# Patient Record
Sex: MALE | Race: WHITE | NOT HISPANIC OR LATINO | Employment: UNEMPLOYED | URBAN - METROPOLITAN AREA
[De-identification: names, ages, dates, MRNs, and addresses within clinical notes are randomized per-mention and may not be internally consistent; named-entity substitution may affect disease eponyms.]

---

## 2018-05-10 ENCOUNTER — APPOINTMENT (EMERGENCY)
Dept: RADIOLOGY | Facility: HOSPITAL | Age: 53
End: 2018-05-10
Payer: COMMERCIAL

## 2018-05-10 ENCOUNTER — HOSPITAL ENCOUNTER (EMERGENCY)
Facility: HOSPITAL | Age: 53
Discharge: HOME | End: 2018-05-10
Attending: EMERGENCY MEDICINE | Admitting: EMERGENCY MEDICINE
Payer: COMMERCIAL

## 2018-05-10 VITALS
WEIGHT: 180 LBS | DIASTOLIC BLOOD PRESSURE: 83 MMHG | RESPIRATION RATE: 17 BRPM | HEIGHT: 68 IN | HEART RATE: 95 BPM | OXYGEN SATURATION: 98 % | TEMPERATURE: 98.3 F | BODY MASS INDEX: 27.28 KG/M2 | SYSTOLIC BLOOD PRESSURE: 134 MMHG

## 2018-05-10 DIAGNOSIS — S83.90XA KNEE SPRAIN: Primary | ICD-10-CM

## 2018-05-10 PROCEDURE — 99283 EMERGENCY DEPT VISIT LOW MDM: CPT

## 2018-05-10 PROCEDURE — 73564 X-RAY EXAM KNEE 4 OR MORE: CPT

## 2018-05-10 NOTE — ED PROVIDER NOTES
History  Chief Complaint   Patient presents with    Knee Pain     pt with right knee pain and swelling after tripping on steps      Patient is a pleasant 69-year-old male that reports to the emergency department with right knee pain after falling  He has a history of surgery on that right knee  No fevers, chills, sweats, nausea, vomiting, diarrhea  No obvious ligamentous injury  Medical decision making: This is a well-appearing 69-year-old male, likely knee sprain, will have him follow up with Orthopedics  The patient (and any family present) verbalized understanding of the discharge instructions and warnings that would necessitate return to the Emergency Department  Gave verbal in addition to written discharge instructions  Specifically highlighted areas of special concern regarding the written and verbal discharge instructions and return precautions  All questions were answered prior to discharge  Prior to Admission Medications   Prescriptions Last Dose Informant Patient Reported? Taking?   amoxicillin-clavulanate (AUGMENTIN) 875-125 mg per tablet   No No   Sig: Take 1 tablet by mouth every 12 (twelve) hours   losartan (COZAAR) 25 mg tablet   Yes No   Sig: Take 25 mg by mouth daily   metFORMIN (GLUCOPHAGE) 1000 MG tablet   Yes No   Sig: Take 1,000 mg by mouth 2 (two) times a day with meals   simvastatin (ZOCOR) 20 mg tablet   Yes No   Sig: Take 20 mg by mouth daily at bedtime      Facility-Administered Medications: None       History reviewed  No pertinent past medical history  Past Surgical History:   Procedure Laterality Date    EYE SURGERY      KNEE SURGERY         History reviewed  No pertinent family history  I have reviewed and agree with the history as documented      Social History   Substance Use Topics    Smoking status: Never Smoker    Smokeless tobacco: Never Used    Alcohol use Yes      Comment: social        Review of Systems   Musculoskeletal: Positive for arthralgias  All other systems reviewed and are negative  Physical Exam  ED Triage Vitals [05/10/18 1810]   Temperature Pulse Respirations Blood Pressure SpO2   98 3 °F (36 8 °C) 95 16 130/78 100 %      Temp Source Heart Rate Source Patient Position - Orthostatic VS BP Location FiO2 (%)   Oral Monitor Sitting Right arm --      Pain Score       8           Orthostatic Vital Signs  Vitals:    05/10/18 1810   BP: 130/78   Pulse: 95   Patient Position - Orthostatic VS: Sitting       Physical Exam   Constitutional: He is oriented to person, place, and time  He appears well-developed and well-nourished  HENT:   Head: Normocephalic and atraumatic  Eyes: EOM are normal  Pupils are equal, round, and reactive to light  Neck: Normal range of motion  Neck supple  Cardiovascular: Normal rate, regular rhythm and normal heart sounds  No murmur heard  Pulmonary/Chest: Effort normal and breath sounds normal  No respiratory distress  He has no wheezes  Abdominal: Soft  Bowel sounds are normal  He exhibits no distension  There is no tenderness  Musculoskeletal: Normal range of motion  He exhibits tenderness  He exhibits no edema  Neurological: He is alert and oriented to person, place, and time  No cranial nerve deficit  Coordination normal    Skin: Skin is warm and dry  He is not diaphoretic  No erythema  Psychiatric: He has a normal mood and affect  His behavior is normal    Nursing note and vitals reviewed        ED Medications  Medications - No data to display    Diagnostic Studies  Results Reviewed     None                 XR knee 4+ vw right injury    (Results Pending)              Procedures  Procedures       Phone Contacts  ED Phone Contact    ED Course                               MDM  CritCare Time    Disposition  Final diagnoses:   Knee sprain     Time reflects when diagnosis was documented in both MDM as applicable and the Disposition within this note     Time User Action Codes Description Comment    5/10/2018  7:29 PM Simran Jackson 69 Knee sprain       ED Disposition     ED Disposition Condition Comment    Discharge  Keerthi Tawnyks discharge to home/self care  Condition at discharge: Good        Follow-up Information     Follow up With Specialties Details Why [facility]  In 3 days          Patient's Medications   Discharge Prescriptions    No medications on file     No discharge procedures on file      ED Provider  Electronically Signed by           Kim Chu MD  05/10/18 7450

## 2018-05-10 NOTE — DISCHARGE INSTRUCTIONS
Knee Sprain   WHAT YOU NEED TO KNOW:   A knee sprain occurs when one or more ligaments in your knee are suddenly stretched or torn  Ligaments are tissues that hold bones together  Ligaments support the knee and keep the joint and bones in the correct position  DISCHARGE INSTRUCTIONS:   Return to the emergency department if:   · Any part of your leg feels cold, numb, or looks pale     Contact your healthcare provider if:   · You have new or increased swelling, bruising, or pain in your knee  · Your symptoms do not improve within 6 weeks, even with treatment  · You have questions or concerns about your condition or care  Medicines:   · NSAIDs , such as ibuprofen, help decrease swelling, pain, and fever  This medicine is available with or without a doctor's order  NSAIDs can cause stomach bleeding or kidney problems in certain people  If you take blood thinner medicine, always ask your healthcare provider if NSAIDs are safe for you  Always read the medicine label and follow directions  · Acetaminophen  decreases pain and fever  It is available without a doctor's order  Ask how much to take and how often to take it  Follow directions  Read the labels of all other medicines you are using to see if they also contain acetaminophen, or ask your doctor or pharmacist  Acetaminophen can cause liver damage if not taken correctly  Do not use more than 4 grams (4,000 milligrams) total of acetaminophen in one day  · Prescription pain medicine  may be given  Ask how to take this medicine safely  · Take your medicine as directed  Contact your healthcare provider if you think your medicine is not helping or if you have side effects  Tell him or her if you are allergic to any medicine  Keep a list of the medicines, vitamins, and herbs you take  Include the amounts, and when and why you take them  Bring the list or the pill bottles to follow-up visits   Carry your medicine list with you in case of an emergency  Self-care:   · Rest  your knee and do not exercise  You may be told to keep weight off your knee  This means that you should not walk on your injured leg  Rest helps decrease swelling and allows the injury to heal  You can do gentle range of motion (ROM) exercises as directed  This will prevent stiffness  · Apply ice  on your knee for 15 to 20 minutes every hour or as directed  Use an ice pack, or put crushed ice in a plastic bag  Cover it with a towel  Ice helps prevent tissue damage and decreases swelling and pain  · Apply compression to your knee as directed  You may need to wear an elastic bandage  This helps keep your injured knee from moving too much while it heals  You can loosen or tighten the elastic bandage to make it comfortable  It should be tight enough for you to feel support  It should not be so tight that it causes your toes to feel numb or tingly  If you are wearing an elastic bandage, take it off and rewrap it once a day  · Elevate your knee  above the level of your heart as often as you can  This will help decrease swelling and pain  Prop your leg on pillows or blankets to keep it elevated comfortably  Do not put pillows directly behind your knee  · Use support devices as directed:  Support devices such as a splint or brace may be needed  These devices limit movement and protect your joint while it heals  You may be given crutches to use until you can stand on your injured leg without pain  Use devices as directed  Physical therapy:  A physical therapist teaches you exercises to help improve movement and strength, and to decrease pain  Prevent another knee sprain:  Exercise your legs to keep your muscles strong  Strong leg muscles help protect your knee and prevent strain  The following may also prevent a knee sprain:  · Slowly start your exercise or training program   Slowly increase the time, distance, and intensity of your exercise   Sudden increases in training may cause you to injure your knee again  · Wear protective braces and equipment as directed  Braces may prevent your knee from moving the wrong way and causing another sprain  Protective equipment may support your bones and ligaments to prevent injury  · Warm up and stretch before exercise  Warm up by walking or using an exercise bike before starting your regular exercise  Do gentle stretches after warming up  This helps to loosen your muscles and decrease stress on your knee  Cool down and stretch after you exercise  · Wear shoes that fit correctly and support your feet  Replace your running or exercise shoes before the padding or shock absorption is worn out  Ask your healthcare provider which exercise shoes are best for you  Ask if you should wear special shoe inserts  Shoe inserts can help support your heels and arches or keep your foot lined up correctly in your shoes  Exercise on flat surfaces  Follow up with your healthcare provider as directed:  Write down your questions so you remember to ask them during your visits  © 2017 [facility] Information is for End User's use only and may not be sold, redistributed or otherwise used for commercial purposes  All illustrations and images included in CareNotes® are the copyrighted property of [employer]  or [employer]  The above information is an  only  It is not intended as medical advice for individual conditions or treatments  Talk to your doctor, nurse or pharmacist before following any medical regimen to see if it is safe and effective for you

## 2019-01-02 ENCOUNTER — APPOINTMENT (EMERGENCY)
Dept: RADIOLOGY | Facility: HOSPITAL | Age: 54
End: 2019-01-02
Payer: COMMERCIAL

## 2019-01-02 ENCOUNTER — HOSPITAL ENCOUNTER (EMERGENCY)
Facility: HOSPITAL | Age: 54
Discharge: HOME | End: 2019-01-02
Attending: EMERGENCY MEDICINE | Admitting: EMERGENCY MEDICINE
Payer: COMMERCIAL

## 2019-01-02 VITALS
OXYGEN SATURATION: 95 % | HEIGHT: 68 IN | BODY MASS INDEX: 28.04 KG/M2 | DIASTOLIC BLOOD PRESSURE: 79 MMHG | SYSTOLIC BLOOD PRESSURE: 121 MMHG | HEART RATE: 106 BPM | RESPIRATION RATE: 18 BRPM | TEMPERATURE: 98.3 F | WEIGHT: 185 LBS

## 2019-01-02 DIAGNOSIS — R07.81 RIB PAIN ON RIGHT SIDE: Primary | ICD-10-CM

## 2019-01-02 LAB
ATRIAL RATE: 102 BPM
P AXIS: 54 DEGREES
PR INTERVAL: 144 MS
QRS AXIS: 19 DEGREES
QRSD INTERVAL: 82 MS
QT INTERVAL: 334 MS
QTC INTERVAL: 435 MS
T WAVE AXIS: 39 DEGREES
VENTRICULAR RATE: 102 BPM

## 2019-01-02 PROCEDURE — 93010 ELECTROCARDIOGRAM REPORT: CPT | Performed by: INTERNAL MEDICINE

## 2019-01-02 PROCEDURE — 93005 ELECTROCARDIOGRAM TRACING: CPT

## 2019-01-02 PROCEDURE — 71101 X-RAY EXAM UNILAT RIBS/CHEST: CPT

## 2019-01-02 PROCEDURE — 99284 EMERGENCY DEPT VISIT MOD MDM: CPT

## 2019-01-02 RX ORDER — IBUPROFEN 600 MG/1
600 TABLET ORAL ONCE
Status: COMPLETED | OUTPATIENT
Start: 2019-01-02 | End: 2019-01-02

## 2019-01-02 RX ADMIN — IBUPROFEN 600 MG: 600 TABLET ORAL at 18:58

## 2019-01-02 NOTE — ED PROVIDER NOTES
History  Chief Complaint   Patient presents with    Rib Injury     Patient presents to ER as a walk in from home for an injury to his ribs from around 1300 hours today - hit was an aproximately 10-12" diamater log from a tree he was helping cut down  Small abrasion noted R side ribs  Pt reports 8/10 pain with deep breaths, 7/10 at rest     HPI     56-year-old male with history of type 2 diabetes and hyperlipidemia who presents for evaluation of pain in his right rib cage  Patient states that he was helping a friend move some tree logs at 1:00 p m  this afternoon when a tree fell and hit him in the R ribcage  He did not fall to the ground, did not hit his head or lose consciousness  He states that the pain is worse with certain movements and with taking deep breaths  Denies shortness of breath  He is not on blood thinners  Denies any other areas of pain  No prior history of rib fractures  Denies any chest pain prior to getting hit in the ribs  He has not tried anything to help with the pain  Prior to Admission Medications   Prescriptions Last Dose Informant Patient Reported? Taking?   losartan (COZAAR) 25 mg tablet   Yes No   Sig: Take 25 mg by mouth daily   metFORMIN (GLUCOPHAGE) 1000 MG tablet   Yes No   Sig: Take 1,000 mg by mouth 2 (two) times a day with meals   simvastatin (ZOCOR) 20 mg tablet   Yes No   Sig: Take 20 mg by mouth daily at bedtime   timolol (BETIMOL) 0 25 % ophthalmic solution   Yes Yes   Sig: Administer 2 drops into the left eye 2 (two) times a day      Facility-Administered Medications: None       Past Medical History:   Diagnosis Date    Diabetes mellitus ([facility] [address] )     type 2, diagnosed in 2013       Past Surgical History:   Procedure Laterality Date    EYE SURGERY      R eye,     KNEE SURGERY         History reviewed  No pertinent family history  I have reviewed and agree with the history as documented      Social History   Substance Use Topics    Smoking status: Never Smoker    Smokeless tobacco: Never Used    Alcohol use Yes      Comment: social        Review of Systems   Constitutional: Negative for chills and fever  HENT: Negative for congestion  Eyes: Negative for visual disturbance  Respiratory: Negative for cough and shortness of breath  Cardiovascular: Positive for chest pain (R chest wall)  Negative for leg swelling  Gastrointestinal: Negative for abdominal pain, nausea and vomiting  Musculoskeletal: Negative for arthralgias, back pain, neck pain and neck stiffness  Skin: Negative for rash  Neurological: Negative for weakness, numbness and headaches  Psychiatric/Behavioral: Negative for agitation, behavioral problems and confusion  Physical Exam  Physical Exam   Constitutional: He is oriented to person, place, and time  He appears well-developed and well-nourished  No distress  HENT:   Head: Normocephalic and atraumatic  Right Ear: External ear normal    Left Ear: External ear normal    Nose: Nose normal    Mouth/Throat: Oropharynx is clear and moist    Eyes: Conjunctivae are normal    Neck: Normal range of motion  Neck supple  Cardiovascular: Regular rhythm, normal heart sounds and intact distal pulses  Exam reveals no gallop and no friction rub  No murmur heard  Tachycardic to 105 bpm   Pulmonary/Chest: Effort normal and breath sounds normal  No respiratory distress  He has no wheezes  He has no rales  He exhibits tenderness (TTP to the R lateral chest wall, no palpable rib fractures, no bruising)  Abdominal: Soft  Bowel sounds are normal  He exhibits no distension  There is no tenderness  There is no guarding  Musculoskeletal: Normal range of motion  He exhibits no edema or deformity  Neurological: He is alert and oriented to person, place, and time  He exhibits normal muscle tone  Skin: Skin is warm and dry  He is not diaphoretic         Vital Signs  ED Triage Vitals [01/02/19 1726]   Temperature Pulse Respirations Blood Pressure SpO2   98 3 °F (36 8 °C) (!) 106 18 121/79 95 %      Temp src Heart Rate Source Patient Position - Orthostatic VS BP Location FiO2 (%)   -- -- Lying Right arm --      Pain Score       5           Vitals:    01/02/19 1726   BP: 121/79   Pulse: (!) 106   Patient Position - Orthostatic VS: Lying       Visual Acuity      ED Medications  Medications   ibuprofen (MOTRIN) tablet 600 mg (600 mg Oral Given 1/2/19 8978)       Diagnostic Studies  Results Reviewed     None                 XR ribs with pa chest min 3 views RIGHT   Final Result by oNrma Mohr MD (01/02 2025)      No active cardiopulmonary disease  No evidence of rib fractures  Workstation performed: [id number]                    Procedures  Procedures       Phone Contacts  ED Phone Contact    ED Course                               MDM  Number of Diagnoses or Management Options  Rib pain on right side: new and requires workup  Diagnosis management comments: Generally well appearing, afebrile and hemodynamically stable, mildly tachycardic to 105 beats per minute, improved prior to discharge  Patient has normal lung sounds bilaterally, has some tenderness to palpation of the right rib cage but no palpable rib fractures  No cough  Rib films obtained with no acute fractures, no evidence of pneumothorax  Suspect contusion  No bruising on the chest wall or abdominal tenderness to suggest need for further imaging or further traumatic injury  Patient did not hit his head or lose consciousness  Suspect contusion of the chest wall  Patient given incentive spirometer and taught how to use it  He was offered pain medication however declines  Recommend Motrin and Tylenol as needed  He was discharged in good condition with return precautions discussed for increased chest pain or shortness of breath         Amount and/or Complexity of Data Reviewed  Tests in the radiology section of CPT®: ordered and reviewed  Independent visualization of images, tracings, or specimens: yes    Patient Progress  Patient progress: stable    CritCare Time           Disposition  Final diagnoses:   Rib pain on right side     Time reflects when diagnosis was documented in both MDM as applicable and the Disposition within this note     Time User Action Codes Description Comment    1/2/2019  8:47 PM Evelio Rico Add [R07 81] Rib pain on right side       ED Disposition     ED Disposition Condition Comment    Discharge  Pepe Link discharge to home/self care  Condition at discharge: Good        Follow-up Information     Follow up With Specialties Details Why [address] Asif Joseph MD Family Medicine In 1 week If pain persists  [address]   [address] Emergency Department Emergency Medicine  For sudden worsening of pain with shortness of breath  [facility] [address]  [address] [place] [address] ED, [address] [place] [address], [facility]          Discharge Medication List as of 1/2/2019  8:48 PM      CONTINUE these medications which have NOT CHANGED    Details   timolol (BETIMOL) 0 25 % ophthalmic solution Administer 2 drops into the left eye 2 (two) times a day, Historical Med      losartan (COZAAR) 25 mg tablet Take 25 mg by mouth daily, Until Discontinued, Historical Med      metFORMIN (GLUCOPHAGE) 1000 MG tablet Take 1,000 mg by mouth 2 (two) times a day with meals, Until Discontinued, Historical Med      simvastatin (ZOCOR) 20 mg tablet Take 20 mg by mouth daily at bedtime, Until Discontinued, Historical Med           No discharge procedures on file      ED Provider  Electronically Signed by           Jimmy Thomas MD  01/02/19 0605

## 2019-01-03 NOTE — DISCHARGE INSTRUCTIONS
Chest Wall Pain, Ambulatory Care   GENERAL INFORMATION:   Chest wall pain  may be caused by problems with the muscles, cartilage, or bones of the chest wall  Chest wall pain may also be caused by pain that spreads to your chest from another part of your body  The pain may be aching, severe, dull, or sharp  It may come and go, or it may be constant  The pain may be worse when you move in certain ways, breathe deeply, or cough  Seek immediate care for the following symptoms:   · Severe pain    · You have any of the following signs of a heart attack:      ¨ Squeezing, pressure, or pain in your chest that lasts longer than 5 minutes or returns    ¨ Discomfort or pain in your back, neck, jaw, stomach, or arm     ¨ Trouble breathing    ¨ Nausea or vomiting    ¨ Lightheadedness or a sudden cold sweat, especially with chest pain or trouble breathing  Treatment  depends on the cause of your chest wall pain  You may need any of the following:  · NSAIDs  help decrease swelling and pain or fever  This medicine is available with or without a doctor's order  NSAIDs can cause stomach bleeding or kidney problems in certain people  If you take blood thinner medicine, always ask your healthcare provider if NSAIDs are safe for you  Always read the medicine label and follow directions  · Acetaminophen  decreases pain  It is available without a doctor's order  Ask how much to take and how often to take it  Follow directions  Acetaminophen can cause liver damage if not taken correctly  · Apply heat  on your chest for 20 to 30 minutes every 2 hours for as many days as directed  Heat helps decrease pain and muscle spasms  · Apply ice  on your chest for 15 to 20 minutes every hour or as directed  Use an ice pack, or put crushed ice in a plastic bag  Cover it with a towel  Ice helps prevent tissue damage and decreases swelling and pain    Follow up with your healthcare provider as directed:  Write down your questions so you remember to ask them during your visits  CARE AGREEMENT:   You have the right to help plan your care  Learn about your health condition and how it may be treated  Discuss treatment options with your caregivers to decide what care you want to receive  You always have the right to refuse treatment  The above information is an  only  It is not intended as medical advice for individual conditions or treatments  Talk to your doctor, nurse or pharmacist before following any medical regimen to see if it is safe and effective for you  © 2014 [address] is for End User's use only and may not be sold, redistributed or otherwise used for commercial purposes  All illustrations and images included in CareNotes® are the copyrighted property of [email or web address]  or [employer]

## 2021-01-05 ENCOUNTER — APPOINTMENT (EMERGENCY)
Dept: RADIOLOGY | Facility: HOSPITAL | Age: 56
DRG: 871 | End: 2021-01-05
Payer: COMMERCIAL

## 2021-01-05 ENCOUNTER — HOSPITAL ENCOUNTER (INPATIENT)
Facility: HOSPITAL | Age: 56
LOS: 5 days | Discharge: HOME | DRG: 871 | End: 2021-01-11
Attending: EMERGENCY MEDICINE | Admitting: INTERNAL MEDICINE
Payer: COMMERCIAL

## 2021-01-05 DIAGNOSIS — U07.1 PNEUMONIA DUE TO COVID-19 VIRUS: ICD-10-CM

## 2021-01-05 DIAGNOSIS — J12.82 PNEUMONIA DUE TO COVID-19 VIRUS: ICD-10-CM

## 2021-01-05 DIAGNOSIS — J96.01 ACUTE RESPIRATORY FAILURE WITH HYPOXIA (HCC): ICD-10-CM

## 2021-01-05 DIAGNOSIS — U07.1 COVID-19: Primary | ICD-10-CM

## 2021-01-05 LAB
ALBUMIN SERPL BCP-MCNC: 3 G/DL (ref 3.5–5)
ALP SERPL-CCNC: 62 U/L (ref 46–116)
ALT SERPL W P-5'-P-CCNC: 30 U/L (ref 12–78)
ANION GAP SERPL CALCULATED.3IONS-SCNC: 10 MMOL/L (ref 4–13)
APTT PPP: 37 SECONDS (ref 23–37)
AST SERPL W P-5'-P-CCNC: 28 U/L (ref 5–45)
BASOPHILS # BLD AUTO: 0.01 THOUSANDS/ΜL (ref 0–0.1)
BASOPHILS NFR BLD AUTO: 0 % (ref 0–1)
BILIRUB SERPL-MCNC: 0.7 MG/DL (ref 0.2–1)
BUN SERPL-MCNC: 13 MG/DL (ref 5–25)
CALCIUM ALBUM COR SERPL-MCNC: 9.1 MG/DL (ref 8.3–10.1)
CALCIUM SERPL-MCNC: 8.3 MG/DL (ref 8.3–10.1)
CHLORIDE SERPL-SCNC: 94 MMOL/L (ref 100–108)
CK SERPL-CCNC: 91 U/L (ref 39–308)
CO2 SERPL-SCNC: 28 MMOL/L (ref 21–32)
CREAT SERPL-MCNC: 0.91 MG/DL (ref 0.6–1.3)
CRP SERPL QL: 88.4 MG/L
D DIMER PPP FEU-MCNC: 0.47 UG/ML FEU
EOSINOPHIL # BLD AUTO: 0 THOUSAND/ΜL (ref 0–0.61)
EOSINOPHIL NFR BLD AUTO: 0 % (ref 0–6)
ERYTHROCYTE [DISTWIDTH] IN BLOOD BY AUTOMATED COUNT: 13.1 % (ref 11.6–15.1)
GFR SERPL CREATININE-BSD FRML MDRD: 95 ML/MIN/1.73SQ M
GLUCOSE SERPL-MCNC: 201 MG/DL (ref 65–140)
HCT VFR BLD AUTO: 41.4 % (ref 36.5–49.3)
HGB BLD-MCNC: 14.7 G/DL (ref 12–17)
IMM GRANULOCYTES # BLD AUTO: 0.02 THOUSAND/UL (ref 0–0.2)
IMM GRANULOCYTES NFR BLD AUTO: 0 % (ref 0–2)
INR PPP: 1.11 (ref 0.84–1.19)
LACTATE SERPL-SCNC: 1.1 MMOL/L (ref 0.5–2)
LYMPHOCYTES # BLD AUTO: 0.8 THOUSANDS/ΜL (ref 0.6–4.47)
LYMPHOCYTES NFR BLD AUTO: 13 % (ref 14–44)
MAGNESIUM SERPL-MCNC: 2.2 MG/DL (ref 1.6–2.6)
MCH RBC QN AUTO: 30.5 PG (ref 26.8–34.3)
MCHC RBC AUTO-ENTMCNC: 35.5 G/DL (ref 31.4–37.4)
MCV RBC AUTO: 86 FL (ref 82–98)
MONOCYTES # BLD AUTO: 0.35 THOUSAND/ΜL (ref 0.17–1.22)
MONOCYTES NFR BLD AUTO: 6 % (ref 4–12)
NEUTROPHILS # BLD AUTO: 5.17 THOUSANDS/ΜL (ref 1.85–7.62)
NEUTS SEG NFR BLD AUTO: 81 % (ref 43–75)
NRBC BLD AUTO-RTO: 0 /100 WBCS
NT-PROBNP SERPL-MCNC: 69 PG/ML
PLATELET # BLD AUTO: 193 THOUSANDS/UL (ref 149–390)
PMV BLD AUTO: 10.1 FL (ref 8.9–12.7)
POTASSIUM SERPL-SCNC: 3.5 MMOL/L (ref 3.5–5.3)
PROT SERPL-MCNC: 7.2 G/DL (ref 6.4–8.2)
PROTHROMBIN TIME: 14.5 SECONDS (ref 11.6–14.5)
RBC # BLD AUTO: 4.82 MILLION/UL (ref 3.88–5.62)
SODIUM SERPL-SCNC: 132 MMOL/L (ref 136–145)
TROPONIN I SERPL-MCNC: <0.02 NG/ML
WBC # BLD AUTO: 6.35 THOUSAND/UL (ref 4.31–10.16)

## 2021-01-05 PROCEDURE — 85730 THROMBOPLASTIN TIME PARTIAL: CPT | Performed by: PHYSICIAN ASSISTANT

## 2021-01-05 PROCEDURE — 83605 ASSAY OF LACTIC ACID: CPT | Performed by: PHYSICIAN ASSISTANT

## 2021-01-05 PROCEDURE — 87040 BLOOD CULTURE FOR BACTERIA: CPT | Performed by: PHYSICIAN ASSISTANT

## 2021-01-05 PROCEDURE — 99285 EMERGENCY DEPT VISIT HI MDM: CPT | Performed by: PHYSICIAN ASSISTANT

## 2021-01-05 PROCEDURE — 85379 FIBRIN DEGRADATION QUANT: CPT | Performed by: PHYSICIAN ASSISTANT

## 2021-01-05 PROCEDURE — 83735 ASSAY OF MAGNESIUM: CPT | Performed by: PHYSICIAN ASSISTANT

## 2021-01-05 PROCEDURE — 36415 COLL VENOUS BLD VENIPUNCTURE: CPT | Performed by: PHYSICIAN ASSISTANT

## 2021-01-05 PROCEDURE — 99285 EMERGENCY DEPT VISIT HI MDM: CPT

## 2021-01-05 PROCEDURE — 84484 ASSAY OF TROPONIN QUANT: CPT | Performed by: PHYSICIAN ASSISTANT

## 2021-01-05 PROCEDURE — 86140 C-REACTIVE PROTEIN: CPT | Performed by: PHYSICIAN ASSISTANT

## 2021-01-05 PROCEDURE — 83880 ASSAY OF NATRIURETIC PEPTIDE: CPT | Performed by: PHYSICIAN ASSISTANT

## 2021-01-05 PROCEDURE — 85025 COMPLETE CBC W/AUTO DIFF WBC: CPT | Performed by: PHYSICIAN ASSISTANT

## 2021-01-05 PROCEDURE — 82550 ASSAY OF CK (CPK): CPT | Performed by: PHYSICIAN ASSISTANT

## 2021-01-05 PROCEDURE — 84145 PROCALCITONIN (PCT): CPT | Performed by: PHYSICIAN ASSISTANT

## 2021-01-05 PROCEDURE — 71045 X-RAY EXAM CHEST 1 VIEW: CPT

## 2021-01-05 PROCEDURE — 82728 ASSAY OF FERRITIN: CPT | Performed by: PHYSICIAN ASSISTANT

## 2021-01-05 PROCEDURE — 80053 COMPREHEN METABOLIC PANEL: CPT | Performed by: PHYSICIAN ASSISTANT

## 2021-01-05 PROCEDURE — 93005 ELECTROCARDIOGRAM TRACING: CPT

## 2021-01-05 PROCEDURE — 96360 HYDRATION IV INFUSION INIT: CPT

## 2021-01-05 PROCEDURE — 85610 PROTHROMBIN TIME: CPT | Performed by: PHYSICIAN ASSISTANT

## 2021-01-05 RX ORDER — DOXYCYCLINE HYCLATE 100 MG/1
100 CAPSULE ORAL ONCE
Status: COMPLETED | OUTPATIENT
Start: 2021-01-05 | End: 2021-01-06

## 2021-01-05 RX ORDER — ACETAMINOPHEN 325 MG/1
975 TABLET ORAL ONCE
Status: COMPLETED | OUTPATIENT
Start: 2021-01-05 | End: 2021-01-05

## 2021-01-05 RX ADMIN — ACETAMINOPHEN 975 MG: 325 TABLET, FILM COATED ORAL at 22:12

## 2021-01-05 RX ADMIN — SODIUM CHLORIDE 1000 ML: 0.9 INJECTION, SOLUTION INTRAVENOUS at 22:16

## 2021-01-06 PROBLEM — J12.82 PNEUMONIA DUE TO COVID-19 VIRUS: Status: ACTIVE | Noted: 2021-01-06

## 2021-01-06 PROBLEM — R07.9 CHEST PAIN: Status: ACTIVE | Noted: 2021-01-06

## 2021-01-06 PROBLEM — E11.9 TYPE 2 DIABETES MELLITUS WITHOUT COMPLICATION (HCC): Status: ACTIVE | Noted: 2021-01-06

## 2021-01-06 PROBLEM — E87.1 HYPONATREMIA: Status: ACTIVE | Noted: 2021-01-06

## 2021-01-06 PROBLEM — A41.9 SEPSIS (HCC): Status: ACTIVE | Noted: 2021-01-06

## 2021-01-06 PROBLEM — U07.1 PNEUMONIA DUE TO COVID-19 VIRUS: Status: ACTIVE | Noted: 2021-01-06

## 2021-01-06 LAB
ABO GROUP BLD: NORMAL
ALBUMIN SERPL BCP-MCNC: 2.3 G/DL (ref 3.5–5)
ALP SERPL-CCNC: 48 U/L (ref 46–116)
ALT SERPL W P-5'-P-CCNC: 24 U/L (ref 12–78)
ANION GAP SERPL CALCULATED.3IONS-SCNC: 10 MMOL/L (ref 4–13)
AST SERPL W P-5'-P-CCNC: 26 U/L (ref 5–45)
ATRIAL RATE: 102 BPM
ATRIAL RATE: 79 BPM
ATRIAL RATE: 79 BPM
BASOPHILS # BLD MANUAL: 0 THOUSAND/UL (ref 0–0.1)
BASOPHILS NFR MAR MANUAL: 0 % (ref 0–1)
BILIRUB SERPL-MCNC: 0.5 MG/DL (ref 0.2–1)
BLD GP AB SCN SERPL QL: NEGATIVE
BUN SERPL-MCNC: 11 MG/DL (ref 5–25)
CALCIUM ALBUM COR SERPL-MCNC: 8.5 MG/DL (ref 8.3–10.1)
CALCIUM SERPL-MCNC: 7.1 MG/DL (ref 8.3–10.1)
CHLORIDE SERPL-SCNC: 103 MMOL/L (ref 100–108)
CO2 SERPL-SCNC: 26 MMOL/L (ref 21–32)
CREAT SERPL-MCNC: 0.78 MG/DL (ref 0.6–1.3)
CRP SERPL QL: 79.3 MG/L
EOSINOPHIL # BLD MANUAL: 0.05 THOUSAND/UL (ref 0–0.4)
EOSINOPHIL NFR BLD MANUAL: 1 % (ref 0–6)
ERYTHROCYTE [DISTWIDTH] IN BLOOD BY AUTOMATED COUNT: 13.4 % (ref 11.6–15.1)
FERRITIN SERPL-MCNC: 561 NG/ML (ref 8–388)
GFR SERPL CREATININE-BSD FRML MDRD: 102 ML/MIN/1.73SQ M
GLUCOSE P FAST SERPL-MCNC: 163 MG/DL (ref 65–99)
GLUCOSE SERPL-MCNC: 163 MG/DL (ref 65–140)
GLUCOSE SERPL-MCNC: 185 MG/DL (ref 65–140)
GLUCOSE SERPL-MCNC: 194 MG/DL (ref 65–140)
GLUCOSE SERPL-MCNC: 197 MG/DL (ref 65–140)
GLUCOSE SERPL-MCNC: 209 MG/DL (ref 65–140)
HCT VFR BLD AUTO: 37.1 % (ref 36.5–49.3)
HGB BLD-MCNC: 13 G/DL (ref 12–17)
LYMPHOCYTES # BLD AUTO: 0.51 THOUSAND/UL (ref 0.6–4.47)
LYMPHOCYTES # BLD AUTO: 10 % (ref 14–44)
MCH RBC QN AUTO: 30.7 PG (ref 26.8–34.3)
MCHC RBC AUTO-ENTMCNC: 35 G/DL (ref 31.4–37.4)
MCV RBC AUTO: 88 FL (ref 82–98)
MONOCYTES # BLD AUTO: 0.25 THOUSAND/UL (ref 0–1.22)
MONOCYTES NFR BLD: 5 % (ref 4–12)
NEUTROPHILS # BLD MANUAL: 4.14 THOUSAND/UL (ref 1.85–7.62)
NEUTS BAND NFR BLD MANUAL: 5 % (ref 0–8)
NEUTS SEG NFR BLD AUTO: 77 % (ref 43–75)
NRBC BLD AUTO-RTO: 0 /100 WBCS
P AXIS: 26 DEGREES
P AXIS: 47 DEGREES
P AXIS: 51 DEGREES
PLATELET # BLD AUTO: 172 THOUSANDS/UL (ref 149–390)
PLATELET BLD QL SMEAR: ADEQUATE
PMV BLD AUTO: 10.3 FL (ref 8.9–12.7)
POTASSIUM SERPL-SCNC: 3.1 MMOL/L (ref 3.5–5.3)
PR INTERVAL: 140 MS
PR INTERVAL: 142 MS
PR INTERVAL: 148 MS
PROCALCITONIN SERPL-MCNC: 0.14 NG/ML
PROT SERPL-MCNC: 5.7 G/DL (ref 6.4–8.2)
QRS AXIS: 15 DEGREES
QRS AXIS: 36 DEGREES
QRS AXIS: 42 DEGREES
QRSD INTERVAL: 100 MS
QRSD INTERVAL: 92 MS
QRSD INTERVAL: 98 MS
QT INTERVAL: 338 MS
QT INTERVAL: 382 MS
QT INTERVAL: 382 MS
QTC INTERVAL: 438 MS
QTC INTERVAL: 438 MS
QTC INTERVAL: 440 MS
RBC # BLD AUTO: 4.23 MILLION/UL (ref 3.88–5.62)
RH BLD: POSITIVE
SODIUM SERPL-SCNC: 139 MMOL/L (ref 136–145)
SPECIMEN EXPIRATION DATE: NORMAL
SPHEROCYTES BLD QL SMEAR: PRESENT
T WAVE AXIS: 40 DEGREES
T WAVE AXIS: 40 DEGREES
T WAVE AXIS: 48 DEGREES
TOTAL CELLS COUNTED SPEC: 100
TROPONIN I SERPL-MCNC: <0.02 NG/ML
TROPONIN I SERPL-MCNC: <0.02 NG/ML
VARIANT LYMPHS # BLD AUTO: 2 %
VENTRICULAR RATE: 102 BPM
VENTRICULAR RATE: 79 BPM
VENTRICULAR RATE: 79 BPM
WBC # BLD AUTO: 5.05 THOUSAND/UL (ref 4.31–10.16)

## 2021-01-06 PROCEDURE — 84484 ASSAY OF TROPONIN QUANT: CPT | Performed by: PHYSICIAN ASSISTANT

## 2021-01-06 PROCEDURE — 86850 RBC ANTIBODY SCREEN: CPT | Performed by: INTERNAL MEDICINE

## 2021-01-06 PROCEDURE — 99223 1ST HOSP IP/OBS HIGH 75: CPT | Performed by: INTERNAL MEDICINE

## 2021-01-06 PROCEDURE — 82948 REAGENT STRIP/BLOOD GLUCOSE: CPT

## 2021-01-06 PROCEDURE — 85027 COMPLETE CBC AUTOMATED: CPT | Performed by: PHYSICIAN ASSISTANT

## 2021-01-06 PROCEDURE — 85007 BL SMEAR W/DIFF WBC COUNT: CPT | Performed by: PHYSICIAN ASSISTANT

## 2021-01-06 PROCEDURE — 86900 BLOOD TYPING SEROLOGIC ABO: CPT | Performed by: INTERNAL MEDICINE

## 2021-01-06 PROCEDURE — XW13325 TRANSFUSION OF CONVALESCENT PLASMA (NONAUTOLOGOUS) INTO PERIPHERAL VEIN, PERCUTANEOUS APPROACH, NEW TECHNOLOGY GROUP 5: ICD-10-PCS | Performed by: INTERNAL MEDICINE

## 2021-01-06 PROCEDURE — XW033E5 INTRODUCTION OF REMDESIVIR ANTI-INFECTIVE INTO PERIPHERAL VEIN, PERCUTANEOUS APPROACH, NEW TECHNOLOGY GROUP 5: ICD-10-PCS | Performed by: INTERNAL MEDICINE

## 2021-01-06 PROCEDURE — 80053 COMPREHEN METABOLIC PANEL: CPT | Performed by: PHYSICIAN ASSISTANT

## 2021-01-06 PROCEDURE — 94660 CPAP INITIATION&MGMT: CPT

## 2021-01-06 PROCEDURE — 86901 BLOOD TYPING SEROLOGIC RH(D): CPT | Performed by: INTERNAL MEDICINE

## 2021-01-06 PROCEDURE — 93010 ELECTROCARDIOGRAM REPORT: CPT | Performed by: INTERNAL MEDICINE

## 2021-01-06 PROCEDURE — 94760 N-INVAS EAR/PLS OXIMETRY 1: CPT

## 2021-01-06 PROCEDURE — 36415 COLL VENOUS BLD VENIPUNCTURE: CPT | Performed by: PHYSICIAN ASSISTANT

## 2021-01-06 PROCEDURE — 86140 C-REACTIVE PROTEIN: CPT | Performed by: PHYSICIAN ASSISTANT

## 2021-01-06 PROCEDURE — 93005 ELECTROCARDIOGRAM TRACING: CPT

## 2021-01-06 RX ORDER — LOSARTAN POTASSIUM 25 MG/1
25 TABLET ORAL DAILY
Status: DISCONTINUED | OUTPATIENT
Start: 2021-01-06 | End: 2021-01-11 | Stop reason: HOSPADM

## 2021-01-06 RX ORDER — FAMOTIDINE 20 MG/1
20 TABLET, FILM COATED ORAL 2 TIMES DAILY
Status: DISCONTINUED | OUTPATIENT
Start: 2021-01-06 | End: 2021-01-11 | Stop reason: HOSPADM

## 2021-01-06 RX ORDER — MULTIVITAMIN/IRON/FOLIC ACID 18MG-0.4MG
1 TABLET ORAL DAILY
Status: DISCONTINUED | OUTPATIENT
Start: 2021-01-13 | End: 2021-01-11 | Stop reason: HOSPADM

## 2021-01-06 RX ORDER — DOXYCYCLINE HYCLATE 100 MG/1
100 CAPSULE ORAL EVERY 12 HOURS
Status: DISCONTINUED | OUTPATIENT
Start: 2021-01-06 | End: 2021-01-07

## 2021-01-06 RX ORDER — GUAIFENESIN/DEXTROMETHORPHAN 100-10MG/5
10 SYRUP ORAL EVERY 4 HOURS PRN
Status: DISCONTINUED | OUTPATIENT
Start: 2021-01-06 | End: 2021-01-11 | Stop reason: HOSPADM

## 2021-01-06 RX ORDER — ZINC SULFATE 50(220)MG
220 CAPSULE ORAL DAILY
Status: DISCONTINUED | OUTPATIENT
Start: 2021-01-06 | End: 2021-01-11 | Stop reason: HOSPADM

## 2021-01-06 RX ORDER — ACETAMINOPHEN 325 MG/1
650 TABLET ORAL EVERY 6 HOURS PRN
Status: DISCONTINUED | OUTPATIENT
Start: 2021-01-06 | End: 2021-01-11 | Stop reason: HOSPADM

## 2021-01-06 RX ORDER — BENZONATATE 100 MG/1
100 CAPSULE ORAL 3 TIMES DAILY
Status: DISCONTINUED | OUTPATIENT
Start: 2021-01-06 | End: 2021-01-11 | Stop reason: HOSPADM

## 2021-01-06 RX ORDER — DEXAMETHASONE SODIUM PHOSPHATE 4 MG/ML
6 INJECTION, SOLUTION INTRA-ARTICULAR; INTRALESIONAL; INTRAMUSCULAR; INTRAVENOUS; SOFT TISSUE DAILY
Status: DISCONTINUED | OUTPATIENT
Start: 2021-01-06 | End: 2021-01-11 | Stop reason: HOSPADM

## 2021-01-06 RX ORDER — PRAVASTATIN SODIUM 40 MG
40 TABLET ORAL
Status: DISCONTINUED | OUTPATIENT
Start: 2021-01-06 | End: 2021-01-11 | Stop reason: HOSPADM

## 2021-01-06 RX ORDER — ASCORBIC ACID 500 MG
1000 TABLET ORAL EVERY 12 HOURS SCHEDULED
Status: DISCONTINUED | OUTPATIENT
Start: 2021-01-06 | End: 2021-01-11 | Stop reason: HOSPADM

## 2021-01-06 RX ORDER — POTASSIUM CHLORIDE 20 MEQ/1
40 TABLET, EXTENDED RELEASE ORAL ONCE
Status: COMPLETED | OUTPATIENT
Start: 2021-01-06 | End: 2021-01-06

## 2021-01-06 RX ORDER — GLIMEPIRIDE 2 MG/1
2 TABLET ORAL 2 TIMES DAILY
Status: DISCONTINUED | OUTPATIENT
Start: 2021-01-06 | End: 2021-01-11 | Stop reason: HOSPADM

## 2021-01-06 RX ORDER — MAGNESIUM HYDROXIDE/ALUMINUM HYDROXICE/SIMETHICONE 120; 1200; 1200 MG/30ML; MG/30ML; MG/30ML
30 SUSPENSION ORAL EVERY 6 HOURS PRN
Status: DISCONTINUED | OUTPATIENT
Start: 2021-01-06 | End: 2021-01-11 | Stop reason: HOSPADM

## 2021-01-06 RX ORDER — MELATONIN
2000 DAILY
Status: DISCONTINUED | OUTPATIENT
Start: 2021-01-06 | End: 2021-01-11 | Stop reason: HOSPADM

## 2021-01-06 RX ADMIN — INSULIN LISPRO 1 UNITS: 100 INJECTION, SOLUTION INTRAVENOUS; SUBCUTANEOUS at 11:27

## 2021-01-06 RX ADMIN — ENOXAPARIN SODIUM 30 MG: 30 INJECTION SUBCUTANEOUS at 08:19

## 2021-01-06 RX ADMIN — TIMOLOL MALEATE 2 DROP: 2.5 SOLUTION/ DROPS OPHTHALMIC at 17:32

## 2021-01-06 RX ADMIN — OXYCODONE HYDROCHLORIDE AND ACETAMINOPHEN 1000 MG: 500 TABLET ORAL at 21:32

## 2021-01-06 RX ADMIN — CEFTRIAXONE SODIUM 1000 MG: 10 INJECTION, POWDER, FOR SOLUTION INTRAVENOUS at 01:20

## 2021-01-06 RX ADMIN — DOXYCYCLINE 100 MG: 100 CAPSULE ORAL at 01:20

## 2021-01-06 RX ADMIN — OXYCODONE HYDROCHLORIDE AND ACETAMINOPHEN 1000 MG: 500 TABLET ORAL at 08:19

## 2021-01-06 RX ADMIN — BENZONATATE 100 MG: 100 CAPSULE ORAL at 12:28

## 2021-01-06 RX ADMIN — BENZONATATE 100 MG: 100 CAPSULE ORAL at 16:18

## 2021-01-06 RX ADMIN — FAMOTIDINE 20 MG: 20 TABLET ORAL at 17:32

## 2021-01-06 RX ADMIN — REMDESIVIR 200 MG: 100 INJECTION, POWDER, LYOPHILIZED, FOR SOLUTION INTRAVENOUS at 04:07

## 2021-01-06 RX ADMIN — PRAVASTATIN SODIUM 40 MG: 40 TABLET ORAL at 16:04

## 2021-01-06 RX ADMIN — INSULIN LISPRO 1 UNITS: 100 INJECTION, SOLUTION INTRAVENOUS; SUBCUTANEOUS at 07:52

## 2021-01-06 RX ADMIN — INSULIN LISPRO 1 UNITS: 100 INJECTION, SOLUTION INTRAVENOUS; SUBCUTANEOUS at 23:49

## 2021-01-06 RX ADMIN — GUAIFENESIN AND DEXTROMETHORPHAN 10 ML: 100; 10 SYRUP ORAL at 12:28

## 2021-01-06 RX ADMIN — ZINC SULFATE 220 MG (50 MG) CAPSULE 220 MG: CAPSULE at 08:19

## 2021-01-06 RX ADMIN — DEXAMETHASONE SODIUM PHOSPHATE 6 MG: 4 INJECTION INTRA-ARTICULAR; INTRALESIONAL; INTRAMUSCULAR; INTRAVENOUS; SOFT TISSUE at 12:31

## 2021-01-06 RX ADMIN — ENOXAPARIN SODIUM 30 MG: 30 INJECTION SUBCUTANEOUS at 21:32

## 2021-01-06 RX ADMIN — BENZONATATE 100 MG: 100 CAPSULE ORAL at 21:32

## 2021-01-06 RX ADMIN — FAMOTIDINE 20 MG: 20 TABLET ORAL at 12:31

## 2021-01-06 RX ADMIN — TIMOLOL MALEATE 2 DROP: 2.5 SOLUTION/ DROPS OPHTHALMIC at 08:19

## 2021-01-06 RX ADMIN — POTASSIUM CHLORIDE 40 MEQ: 1500 TABLET, EXTENDED RELEASE ORAL at 07:51

## 2021-01-06 RX ADMIN — DOXYCYCLINE 100 MG: 100 CAPSULE ORAL at 12:31

## 2021-01-06 RX ADMIN — LOSARTAN POTASSIUM 25 MG: 25 TABLET, FILM COATED ORAL at 08:20

## 2021-01-06 RX ADMIN — Medication 2000 UNITS: at 08:19

## 2021-01-06 RX ADMIN — INSULIN LISPRO 1 UNITS: 100 INJECTION, SOLUTION INTRAVENOUS; SUBCUTANEOUS at 16:04

## 2021-01-06 NOTE — ASSESSMENT & PLAN NOTE
· Reports being diagnosed last week on 12/31  · Reports over the last couple of days having worsening shortness of breath, cough, was hypoxemic in 80s prior to arrival  · Was placed initially on 2 L nasal cannula was saturating well and then saturating well on 1 L and is trialing room air during conversation was saturating at 94%  · Will follow mild COVID protocol  · Vitamin-C, D, zinc started  · Due to patient currently being on room air will hold off on IV steroid  · However, due to being high risk with diabetes and initially requiring nasal cannula will start IV remdesivir

## 2021-01-06 NOTE — ASSESSMENT & PLAN NOTE
Lab Results   Component Value Date    HGBA1C 7 0 (H) 01/22/2020       No results for input(s): POCGLU in the last 72 hours      Blood Sugar Average: Last 72 hrs:  ·  blood glucose checks q i d , hypoglycemia protocol  · Will hold home metformin while hospitalized  · Sliding scale insulin

## 2021-01-06 NOTE — ASSESSMENT & PLAN NOTE
· Currently meeting criteria due to being febrile upon arrival, tachycardia which has resolved and tachypnea  · In setting of COVID-19 pneumonia  · Continue antibiotics  · IV fluid bolus given in ED, will hold off on further fluids at this time  · Blood cultures x2, procalcitonin pending  · Lactic acid WNL

## 2021-01-06 NOTE — ED NOTES
Pt placed on room air per provider, on 1 lpm pt saturation level 96%         Terri Mendoza RN  01/05/21 6116

## 2021-01-06 NOTE — ED NOTES
Dr Corey Toledo responded and states he is on his way to evaluate pt        Araceli Matos, CANDACE  01/06/21 4586

## 2021-01-06 NOTE — ED NOTES
Pt removed Non Rebreather to eat dinner, spoke to pt and instructed to remain in non rebreather due to decreasing O2 levels             Shahzad Neri RN  01/06/21 8558

## 2021-01-06 NOTE — UTILIZATION REVIEW
Initial Clinical Review    OBS order 1/5 2302 converted to IP on 1/6 @ 1203 for treatment of COVID and pneumonia     [facility]    Level of Care Med Surg    Estimated length of stay More than 2 Midnights    Certification I certify that inpatient services are medically necessary for this patient for a duration of greater than two midnights  See H&P and MD Progress Notes for additional information about the patient's course of treatment  ED Arrival Information     Expected Arrival Acuity Means of Arrival Escorted By Service Admission Type    - 1/5/2021 21:34 Urgent Ambulance [facility] [facility] Medicine Urgent    Arrival Complaint    SOB         Chief Complaint   Patient presents with    Shortness of Breath     The patient reports testing positive for COVID on 12/31  Today he reports increased shortness of breath  SpO2 85% per EMS on room air, 97% on 6L  Assessment/Plan: 62 yo male to ED from home w/ worsening SOB for the last few days   Dx w/ COVID on 12/31 , feeling much weaker w/ worsening SOB last 24 hrs   Poor appetite , loss of taste and smell   Hypoxic in 80s prior to arrival   Placed on 2l   Admitted OBS status fever 102  4  placed on Vit C,D, zinc , remdesivir   Pt admits to CP will trend trop , tele , consider cardiology eval   Na 132, IV bolus given in ED , recheck in am   F/u BC and pct   1/6 IM Note   On 2l NC , requiring O2 , place on IV dexamethasone , serial trop , CP likely from coughing   Start on cough syrup and monitor      ED Triage Vitals [01/05/21 2143]   Temperature Pulse Respirations Blood Pressure SpO2   (!) 102 4 °F (39 1 °C) 103 (!) 32 115/68 94 %      Temp Source Heart Rate Source Patient Position - Orthostatic VS BP Location FiO2 (%)   Tympanic Monitor Sitting Right arm --      Pain Score       4          Wt Readings from Last 1 Encounters:   01/05/21 78 8 kg (173 lb 11 6 oz)     Additional Vital Signs:   01/06/21 0600    83  32Abnormal 113/68  85  95 %        Lying   01/06/21 0500    77  29Abnormal   112/64  82  94 %      None (Room air)  Lying   01/06/21 0300    77  29Abnormal   108/69  83  93 %      None (Room air)  Lying   01/06/21 0230  97 9 °F (36 6 °C)  80  31Abnormal   108/70  82  94 %      None (Room air)  Lying   01/06/21 0130    79  27Abnormal   106/67  81  94 %      None (Room air)  Sitting   01/06/21 0100    78  24Abnormal   101/64  77  93 %      None (Room air)  Sitting   01/05/21 2300    89  32Abnormal   110/63  80  93 %  28  2 L/min  Nasal cannula  Sitting   01/05/21 2200    101  30Abnormal   121/70  90  94 %  28  2 L/min  Nasal cannula  Lying   01/05/21 2151            94 %               Pertinent Labs/Diagnostic Test Results:   1/5 PCXR Moderate bibasilar groundglass opacity due to Covid 19 pneumonia      Results from last 7 days   Lab Units 01/06/21  0538 01/05/21  2158   WBC Thousand/uL 5 05 6 35   HEMOGLOBIN g/dL 13 0 14 7   HEMATOCRIT % 37 1 41 4   PLATELETS Thousands/uL 172 193   NEUTROS ABS Thousands/µL  --  5 17   BANDS PCT % 5  --      Results from last 7 days   Lab Units 01/06/21  0538 01/05/21  2158   SODIUM mmol/L 139 132*   POTASSIUM mmol/L 3 1* 3 5   CHLORIDE mmol/L 103 94*   CO2 mmol/L 26 28   ANION GAP mmol/L 10 10   BUN mg/dL 11 13   CREATININE mg/dL 0 78 0 91   EGFR ml/min/1 73sq m 102 95   CALCIUM mg/dL 7 1* 8 3   MAGNESIUM mg/dL  --  2 2     Results from last 7 days   Lab Units 01/06/21  0538 01/05/21  2158   AST U/L 26 28   ALT U/L 24 30   ALK PHOS U/L 48 62   TOTAL PROTEIN g/dL 5 7* 7 2   ALBUMIN g/dL 2 3* 3 0*   TOTAL BILIRUBIN mg/dL 0 50 0 70     Results from last 7 days   Lab Units 01/06/21  0716   POC GLUCOSE mg/dl 194*     Results from last 7 days   Lab Units 01/06/21  0538 01/05/21 2158   GLUCOSE RANDOM mg/dL 163* 201*     Results from last 7 days   Lab Units 01/05/21 2158   CK TOTAL U/L 91     Results from last 7 days   Lab Units 01/06/21 0538 01/06/21  0223 01/05/21 2158 TROPONIN I ng/mL <0 02 <0 02 <0 02     Results from last 7 days   Lab Units 01/05/21 2158   D-DIMER QUANTITATIVE ug/ml FEU 0 47     Results from last 7 days   Lab Units 01/05/21 2158   PROTIME seconds 14 5   INR  1 11   PTT seconds 37     Results from last 7 days   Lab Units 01/05/21 2158   LACTIC ACID mmol/L 1 1     Results from last 7 days   Lab Units 01/05/21 2158   NT-PRO BNP pg/mL 69     Results from last 7 days   Lab Units 01/06/21  0538 01/05/21 2158   CRP mg/L 79 3* 88 4*       Results from last 7 days   Lab Units 01/06/21  0538   TOTAL COUNTED  100       ED Treatment:   Medication Administration from 01/05/2021 2134 to 01/06/2021 0740       Date/Time Order Dose Route Action     01/05/2021 2212 acetaminophen (TYLENOL) tablet 975 mg 975 mg Oral Given     01/05/2021 2216 sodium chloride 0 9 % bolus 1,000 mL 1,000 mL Intravenous New Bag     01/06/2021 0120 ceftriaxone (ROCEPHIN) 1 g/50 mL in dextrose IVPB 1,000 mg Intravenous New Bag     01/06/2021 0120 doxycycline hyclate (VIBRAMYCIN) capsule 100 mg 100 mg Oral Given     01/06/2021 0407 remdesivir (Veklury) 200 mg in sodium chloride 0 9 % 250 mL IVPB 200 mg Intravenous New Bag        Past Medical History:   Diagnosis Date    Diabetes mellitus ([facility] )     type 2, diagnosed in 2013     Present on Admission:   Pneumonia due to COVID-19 virus   Sepsis ([facility] )   Hyponatremia   Type 2 diabetes mellitus without complication (HCC)   Chest pain      Admitting Diagnosis: SOB (shortness of breath) [R06 02]  COVID toes [U07 1, R23 8]  Age/Sex: 48 y o  male  Admission Orders:  Scheduled Medications:  ascorbic acid, 1,000 mg, Oral, Q12H [facility]  [START ON 1/7/2021] cefTRIAXone, 1,000 mg, Intravenous, Q24H  cholecalciferol, 2,000 Units, Oral, Daily  doxycycline hyclate, 100 mg, Oral, Q12H  enoxaparin, 30 mg, Subcutaneous, Q12H SADAF  insulin lispro, 1-5 Units, Subcutaneous, TID AC  insulin lispro, 1-5 Units, Subcutaneous, HS  losartan, 25 mg, Oral, Daily  zinc sulfate, 220 mg, Oral, Daily    Followed by  Barak Sanders ON 1/13/2021] multivitamin-minerals, 1 tablet, Oral, Daily  potassium chloride, 40 mEq, Oral, Once  pravastatin, 40 mg, Oral, Daily With Dinner  [START ON 1/7/2021] remdesivir, 100 mg, Intravenous, Q24H  timolol, 2 drop, Left Eye, BID      Continuous IV Infusions:     PRN Meds:  acetaminophen, 650 mg, Oral, Q6H PRN  aluminum-magnesium hydroxide-simethicone, 30 mL, Oral, Q6H PRN    keep O2 sat >90 %   Tele     Network Utilization Review Department  ATTENTION: Please call with any questions or concerns to [phone] and carefully listen to the prompts so that you are directed to the right person  All voicemails are confidential   Eloy Triana all requests for admission clinical reviews, approved or denied determinations and any other requests to dedicated fax number below belonging to the campus where the patient is receiving treatment   List of dedicated fax numbers for the Facilities:  [address] DENIALS (Administrative/Medical Necessity) [phone]   [address] (Maternity/NICU/Pediatrics) [phone] [address] [place] [address] Dr Shannon Brown [phone] (Aleah Lay "Brenna" 103) [address] [place] [address] Sophia Mckeon 14[address] [place] [address] [phone]

## 2021-01-06 NOTE — QUICK NOTE
Notified by nurse that patient is hypoxic on 6 L oxygen  Currently on non-rebreather  Will place on mid flow  Patient already on room to severe on IV Decadron  Will consult Pulmonary  Discussed with ICU team   Discussed with patient regarding convalescent plasma  Patient agreed  Monitor closely  Called wife and updated  Convalescent Plasma was ordered on 1/6/2021  The Fact Sheet for Patients and Parents/Caregivers was provided to the patient and/or healthcare agent  The option to accept or refuse administration was offered  The risks, benefits, and alternatives to treatment were reviewed, and all questions addressed  Disclosure that this treatment is authorized for use under EUA and not FDA approved for treatment was discussed

## 2021-01-06 NOTE — ASSESSMENT & PLAN NOTE
· Admits to chest pain at rest as well as with coughing  · Most likely related to COVID-19 pneumonia and coughing  · Initial troponin negative, D-dimer WNL  · Will trend troponin 2 more times q 3 hours with EKG  · Initial EKG revealing sinus tachycardia  · Monitor on telemetry  · Consider cardiology consult if any increase in troponin  · Will hold off on aspirin do patient currently reporting chest pain has resolved and troponin is negative

## 2021-01-06 NOTE — H&P
H&P- Bentley Brought 1965, 48 y o  male MRN: [id number]    Unit/Bed#: ED 28 Encounter: [id number]    Primary Care Provider: Carol Montanez MD   Date and time admitted to hospital: 1/5/2021  9:35 PM        * Pneumonia due to COVID-19 virus  Assessment & Plan  · Reports being diagnosed last week on 12/31  · Reports over the last couple of days having worsening shortness of breath, cough, was hypoxemic in 80s prior to arrival  · Was placed initially on 2 L nasal cannula was saturating well and then saturating well on 1 L and is trialing room air during conversation was saturating at 94%  · Will follow mild COVID protocol  · Vitamin-C, D, zinc started  · Due to patient currently being on room air will hold off on IV steroid  · However, due to being high risk with diabetes and initially requiring nasal cannula will start IV remdesivir    Chest pain  Assessment & Plan  · Admits to chest pain at rest as well as with coughing  · Most likely related to COVID-19 pneumonia and coughing  · Initial troponin negative, D-dimer WNL  · Will trend troponin 2 more times q 3 hours with EKG  · Initial EKG revealing sinus tachycardia  · Monitor on telemetry  · Consider cardiology consult if any increase in troponin  · Will hold off on aspirin do patient currently reporting chest pain has resolved and troponin is negative    Hyponatremia  Assessment & Plan  · Current level 132  · IV fluid bolus given in the ED  · Check CMP in a m      Sepsis ([facility] )  Assessment & Plan  · Currently meeting criteria due to being febrile upon arrival, tachycardia which has resolved and tachypnea  · In setting of COVID-19 pneumonia  · Continue antibiotics  · IV fluid bolus given in ED, will hold off on further fluids at this time  · Blood cultures x2, procalcitonin pending  · Lactic acid WNL    Type 2 diabetes mellitus without complication [facility])  Assessment & Plan  Lab Results   Component Value Date    HGBA1C 7 0 (H) 01/22/2020       No results for input(s): POCGLU in the last 72 hours  Blood Sugar Average: Last 72 hrs:  ·  blood glucose checks q i d , hypoglycemia protocol  · Will hold home metformin while hospitalized  · Sliding scale insulin      VTE Prophylaxis: Enoxaparin (Lovenox)  / sequential compression device   Code Status:  Level 1, full code  POLST: POLST form is not discussed and not completed at this time  Anticipated Length of Stay:  Patient will be admitted on an Observation basis with an anticipated length of stay of  less than 2 midnights  Justification for Hospital Stay:  See above    Total Time for Visit, including Counseling / Coordination of Care: 1 hour  Greater than 50% of this total time spent on direct patient counseling and coordination of care  Chief Complaint:     Chief Complaint   Patient presents with    Shortness of Breath     The patient reports testing positive for COVID on 12/31  Today he reports increased shortness of breath  SpO2 85% per EMS on room air, 97% on 6L  History of Present Illness:    Pam Dominguez is a 48 y o  male with PMH not limited to type 2 diabetes mellitus who presents with gradual worsening of shortness of breath, cough over the last couple of days  Admits to being diagnosed 12/31 with COVID and reports he has been feeling much weaker with worsening shortness of breath reports in the last 24 hours the with shortness of breath has gotten worse  Admits he had a fever once this past week and it resolved after 2 hours  Admits to poor appetite, loss of taste and smell     Also admits to chest pain which is currently resolved reports that has been intermittent  Review of Systems:    Review of Systems   Constitutional: Positive for activity change, appetite change, fatigue and fever  Respiratory: Positive for cough and shortness of breath  Cardiovascular: Positive for chest pain  Negative for leg swelling  Gastrointestinal: Negative for abdominal pain     Neurological: Negative for dizziness and headaches  Past Medical and Surgical History:     Past Medical History:   Diagnosis Date    Diabetes mellitus ([facility] )     type 2, diagnosed in 2013       Past Surgical History:   Procedure Laterality Date    EYE SURGERY      R eye,     KNEE SURGERY         Meds/Allergies:    Prior to Admission medications    Medication Sig Start Date End Date Taking? Authorizing Provider   losartan (COZAAR) 25 mg tablet Take 25 mg by mouth daily    Historical Provider, MD   metFORMIN (GLUCOPHAGE) 1000 MG tablet Take 1,000 mg by mouth 2 (two) times a day with meals    Historical Provider, MD   simvastatin (ZOCOR) 20 mg tablet Take 20 mg by mouth daily at bedtime    Historical Provider, MD   timolol (BETIMOL) 0 25 % ophthalmic solution Administer 2 drops into the left eye 2 (two) times a day    Historical Provider, MD MAN have reviewed home medication per review of chart    Allergies: Allergies   Allergen Reactions    Fentanyl Palpitations       Social History:     Marital Status: /Civil Union     Patient Pre-hospital Level of Mobility:  Independent  Patient Pre-hospital Diet Restrictions:  Diabetic  Substance Use History:   Social History     Substance and Sexual Activity   Alcohol Use Not Currently    Comment: social     Social History     Tobacco Use   Smoking Status Never Smoker   Smokeless Tobacco Never Used     Social History     Substance and Sexual Activity   Drug Use No       Family History:    History reviewed  No pertinent family history  Physical Exam:     Vitals:   Blood Pressure: 108/70 (01/06/21 0230)  Pulse: 80 (01/06/21 0230)  Temperature: 97 9 °F (36 6 °C) (01/06/21 0230)  Temp Source: Tympanic (01/06/21 0230)  Respirations: (!) 31 (01/06/21 0230)  Height: 5' 8" (172 7 cm) (01/05/21 2143)  Weight - Scale: 78 8 kg (173 lb 11 6 oz) (01/05/21 2143)  SpO2: 94 % (01/06/21 0230)    Physical Exam  Vitals signs and nursing note reviewed     Constitutional:       General: He is not in acute distress  Appearance: Normal appearance  HENT:      Head: Normocephalic  Cardiovascular:      Rate and Rhythm: Normal rate and regular rhythm  Pulses: Normal pulses  Heart sounds: Normal heart sounds  Pulmonary:      Effort: Pulmonary effort is normal  No respiratory distress  Breath sounds: Normal breath sounds  No stridor  No wheezing or rales  Abdominal:      General: Abdomen is flat  Bowel sounds are normal  There is no distension  Palpations: Abdomen is soft  Tenderness: There is no abdominal tenderness  There is no guarding  Musculoskeletal:         General: No tenderness  Right lower leg: No edema  Left lower leg: No edema  Skin:     General: Skin is warm and dry  Findings: No erythema  Neurological:      General: No focal deficit present  Mental Status: He is alert and oriented to person, place, and time  Mental status is at baseline  Psychiatric:         Mood and Affect: Mood normal          Behavior: Behavior normal          Thought Content: Thought content normal          Judgment: Judgment normal            Additional Data:     Lab Results: I have personally reviewed pertinent reports        Results from last 7 days   Lab Units 01/05/21  2158   WBC Thousand/uL 6 35   HEMOGLOBIN g/dL 14 7   HEMATOCRIT % 41 4   PLATELETS Thousands/uL 193   NEUTROS PCT % 81*   LYMPHS PCT % 13*   MONOS PCT % 6   EOS PCT % 0     Results from last 7 days   Lab Units 01/05/21  2158   SODIUM mmol/L 132*   POTASSIUM mmol/L 3 5   CHLORIDE mmol/L 94*   CO2 mmol/L 28   BUN mg/dL 13   CREATININE mg/dL 0 91   ANION GAP mmol/L 10   CALCIUM mg/dL 8 3   ALBUMIN g/dL 3 0*   TOTAL BILIRUBIN mg/dL 0 70   ALK PHOS U/L 62   ALT U/L 30   AST U/L 28   GLUCOSE RANDOM mg/dL 201*     Results from last 7 days   Lab Units 01/05/21  2158   INR  1 11             Results from last 7 days   Lab Units 01/05/21  2158   LACTIC ACID mmol/L 1 1       Imaging: I have personally reviewed pertinent films in PACS    XR chest 1 view portable    (Results Pending)       EKG, Pathology, and Other Studies Reviewed on Admission:   · EKG:  Sinus tachycardia    Allscripts / Epic Records Reviewed: Yes     ** Please Note: This note has been constructed using a voice recognition system   **

## 2021-01-07 LAB
ABO GROUP BLD BPU: NORMAL
ALBUMIN SERPL BCP-MCNC: 2.4 G/DL (ref 3.5–5)
ALP SERPL-CCNC: 54 U/L (ref 46–116)
ALT SERPL W P-5'-P-CCNC: 25 U/L (ref 12–78)
ANION GAP SERPL CALCULATED.3IONS-SCNC: 13 MMOL/L (ref 4–13)
AST SERPL W P-5'-P-CCNC: 23 U/L (ref 5–45)
BILIRUB SERPL-MCNC: 0.5 MG/DL (ref 0.2–1)
BPU ID: NORMAL
BUN SERPL-MCNC: 13 MG/DL (ref 5–25)
CALCIUM ALBUM COR SERPL-MCNC: 9.4 MG/DL (ref 8.3–10.1)
CALCIUM SERPL-MCNC: 8.1 MG/DL (ref 8.3–10.1)
CHLORIDE SERPL-SCNC: 101 MMOL/L (ref 100–108)
CO2 SERPL-SCNC: 22 MMOL/L (ref 21–32)
CREAT SERPL-MCNC: 0.67 MG/DL (ref 0.6–1.3)
CRP SERPL QL: 74 MG/L
D DIMER PPP FEU-MCNC: 0.28 UG/ML FEU
ERYTHROCYTE [DISTWIDTH] IN BLOOD BY AUTOMATED COUNT: 13.2 % (ref 11.6–15.1)
GFR SERPL CREATININE-BSD FRML MDRD: 109 ML/MIN/1.73SQ M
GLUCOSE SERPL-MCNC: 216 MG/DL (ref 65–140)
GLUCOSE SERPL-MCNC: 219 MG/DL (ref 65–140)
GLUCOSE SERPL-MCNC: 232 MG/DL (ref 65–140)
GLUCOSE SERPL-MCNC: 245 MG/DL (ref 65–140)
GLUCOSE SERPL-MCNC: 256 MG/DL (ref 65–140)
HCT VFR BLD AUTO: 37.2 % (ref 36.5–49.3)
HGB BLD-MCNC: 12.7 G/DL (ref 12–17)
MCH RBC QN AUTO: 30.5 PG (ref 26.8–34.3)
MCHC RBC AUTO-ENTMCNC: 34.1 G/DL (ref 31.4–37.4)
MCV RBC AUTO: 89 FL (ref 82–98)
NRBC BLD AUTO-RTO: 0 /100 WBCS
PLATELET # BLD AUTO: 198 THOUSANDS/UL (ref 149–390)
PMV BLD AUTO: 10.3 FL (ref 8.9–12.7)
POTASSIUM SERPL-SCNC: 3.9 MMOL/L (ref 3.5–5.3)
PROCALCITONIN SERPL-MCNC: 0.13 NG/ML
PROT SERPL-MCNC: 6.1 G/DL (ref 6.4–8.2)
RBC # BLD AUTO: 4.17 MILLION/UL (ref 3.88–5.62)
SODIUM SERPL-SCNC: 136 MMOL/L (ref 136–145)
TROPONIN I SERPL-MCNC: <0.02 NG/ML
UNIT DISPENSE STATUS: NORMAL
UNIT PRODUCT CODE: NORMAL
UNIT RH: NORMAL
WBC # BLD AUTO: 3.3 THOUSAND/UL (ref 4.31–10.16)

## 2021-01-07 PROCEDURE — 80053 COMPREHEN METABOLIC PANEL: CPT | Performed by: INTERNAL MEDICINE

## 2021-01-07 PROCEDURE — 84484 ASSAY OF TROPONIN QUANT: CPT | Performed by: INTERNAL MEDICINE

## 2021-01-07 PROCEDURE — 85027 COMPLETE CBC AUTOMATED: CPT | Performed by: INTERNAL MEDICINE

## 2021-01-07 PROCEDURE — 99232 SBSQ HOSP IP/OBS MODERATE 35: CPT | Performed by: INTERNAL MEDICINE

## 2021-01-07 PROCEDURE — 99222 1ST HOSP IP/OBS MODERATE 55: CPT | Performed by: INTERNAL MEDICINE

## 2021-01-07 PROCEDURE — 84145 PROCALCITONIN (PCT): CPT | Performed by: PHYSICIAN ASSISTANT

## 2021-01-07 PROCEDURE — 85379 FIBRIN DEGRADATION QUANT: CPT | Performed by: INTERNAL MEDICINE

## 2021-01-07 PROCEDURE — 86140 C-REACTIVE PROTEIN: CPT | Performed by: INTERNAL MEDICINE

## 2021-01-07 PROCEDURE — 94760 N-INVAS EAR/PLS OXIMETRY 1: CPT

## 2021-01-07 PROCEDURE — 94660 CPAP INITIATION&MGMT: CPT

## 2021-01-07 PROCEDURE — 82948 REAGENT STRIP/BLOOD GLUCOSE: CPT

## 2021-01-07 RX ORDER — ALBUTEROL SULFATE 90 UG/1
2 AEROSOL, METERED RESPIRATORY (INHALATION) EVERY 4 HOURS PRN
Status: DISCONTINUED | OUTPATIENT
Start: 2021-01-07 | End: 2021-01-11 | Stop reason: HOSPADM

## 2021-01-07 RX ADMIN — CEFTRIAXONE SODIUM 1000 MG: 10 INJECTION, POWDER, FOR SOLUTION INTRAVENOUS at 01:46

## 2021-01-07 RX ADMIN — OXYCODONE HYDROCHLORIDE AND ACETAMINOPHEN 1000 MG: 500 TABLET ORAL at 08:50

## 2021-01-07 RX ADMIN — ENOXAPARIN SODIUM 30 MG: 30 INJECTION SUBCUTANEOUS at 08:50

## 2021-01-07 RX ADMIN — FAMOTIDINE 20 MG: 20 TABLET ORAL at 18:22

## 2021-01-07 RX ADMIN — INSULIN LISPRO 2 UNITS: 100 INJECTION, SOLUTION INTRAVENOUS; SUBCUTANEOUS at 18:23

## 2021-01-07 RX ADMIN — INSULIN LISPRO 2 UNITS: 100 INJECTION, SOLUTION INTRAVENOUS; SUBCUTANEOUS at 08:52

## 2021-01-07 RX ADMIN — DEXAMETHASONE SODIUM PHOSPHATE 6 MG: 4 INJECTION INTRA-ARTICULAR; INTRALESIONAL; INTRAMUSCULAR; INTRAVENOUS; SOFT TISSUE at 08:50

## 2021-01-07 RX ADMIN — ENOXAPARIN SODIUM 30 MG: 30 INJECTION SUBCUTANEOUS at 21:54

## 2021-01-07 RX ADMIN — FAMOTIDINE 20 MG: 20 TABLET ORAL at 08:50

## 2021-01-07 RX ADMIN — BENZONATATE 100 MG: 100 CAPSULE ORAL at 21:53

## 2021-01-07 RX ADMIN — DOXYCYCLINE 100 MG: 100 CAPSULE ORAL at 12:19

## 2021-01-07 RX ADMIN — DOXYCYCLINE 100 MG: 100 CAPSULE ORAL at 01:47

## 2021-01-07 RX ADMIN — INSULIN LISPRO 2 UNITS: 100 INJECTION, SOLUTION INTRAVENOUS; SUBCUTANEOUS at 12:18

## 2021-01-07 RX ADMIN — BENZONATATE 100 MG: 100 CAPSULE ORAL at 08:50

## 2021-01-07 RX ADMIN — LOSARTAN POTASSIUM 25 MG: 25 TABLET, FILM COATED ORAL at 08:50

## 2021-01-07 RX ADMIN — OXYCODONE HYDROCHLORIDE AND ACETAMINOPHEN 1000 MG: 500 TABLET ORAL at 21:54

## 2021-01-07 RX ADMIN — ZINC SULFATE 220 MG (50 MG) CAPSULE 220 MG: CAPSULE at 08:50

## 2021-01-07 RX ADMIN — BENZONATATE 100 MG: 100 CAPSULE ORAL at 18:22

## 2021-01-07 RX ADMIN — REMDESIVIR 100 MG: 100 INJECTION, POWDER, LYOPHILIZED, FOR SOLUTION INTRAVENOUS at 04:36

## 2021-01-07 RX ADMIN — TIMOLOL MALEATE 2 DROP: 2.5 SOLUTION/ DROPS OPHTHALMIC at 08:51

## 2021-01-07 RX ADMIN — TIMOLOL MALEATE 2 DROP: 2.5 SOLUTION/ DROPS OPHTHALMIC at 18:23

## 2021-01-07 RX ADMIN — PRAVASTATIN SODIUM 40 MG: 40 TABLET ORAL at 18:22

## 2021-01-07 RX ADMIN — GUAIFENESIN AND DEXTROMETHORPHAN 10 ML: 100; 10 SYRUP ORAL at 03:49

## 2021-01-07 RX ADMIN — ACETAMINOPHEN 650 MG: 325 TABLET, FILM COATED ORAL at 08:51

## 2021-01-07 RX ADMIN — Medication 2000 UNITS: at 08:50

## 2021-01-07 RX ADMIN — INSULIN LISPRO 2 UNITS: 100 INJECTION, SOLUTION INTRAVENOUS; SUBCUTANEOUS at 21:54

## 2021-01-07 NOTE — ASSESSMENT & PLAN NOTE
· Admits to chest pain at rest as well as with coughing  · Most likely related to COVID-19 pneumonia and coughing  · Serial troponin negative  ACS ruled out  also D-dimer negative    · PE ruled out

## 2021-01-07 NOTE — ASSESSMENT & PLAN NOTE
Lab Results   Component Value Date    HGBA1C 7 0 (H) 01/22/2020       Recent Labs     01/06/21  2314 01/07/21  0517 01/07/21  1101 01/07/21  1558   POCGLU 209* 216* 256* 245*       Blood Sugar Average: Last 72 hrs:  · (P) 276 [id number] blood glucose checks q i d , hypoglycemia protocol  · Will hold home metformin while hospitalized  · Sliding scale insulin

## 2021-01-07 NOTE — ASSESSMENT & PLAN NOTE
· Reports being diagnosed last week on 12/31  · Reports over the last couple of days having worsening shortness of breath, cough, was hypoxemic in 80s prior to arrival  · Was placed initially on 2 L nasal cannula was saturating well and then saturating well on 1 L and is trialing room air during conversation was saturating at 94%  · His condition worsened yesterday and he was requiring med flow  Currently at 10 L oxygen  · Evaluated by pulmonary and ICU team   · Continue dexamethasone and remdesivir  · Discontinue IV antibiotics because procalcitonin negative    · Monitor closely  · Given convalescent plasma January 6th

## 2021-01-07 NOTE — PROGRESS NOTES
Progress Note - Beverley Caballero 1965, 48 y o  male MRN: [id number]    Unit/Bed#: -01 Encounter: [id number]    Primary Care Provider: Shell Hunter MD   Date and time admitted to hospital: 1/5/2021  9:35 PM        Chest pain  Assessment & Plan  · Admits to chest pain at rest as well as with coughing  · Most likely related to COVID-19 pneumonia and coughing  · Serial troponin negative  ACS ruled out  also D-dimer negative  · PE ruled out    Hyponatremia  Assessment & Plan  · Resolved with IV fluid    Sepsis ([address] )  Assessment & Plan  · Currently meeting criteria due to being febrile upon arrival, tachycardia which has resolved and tachypnea  · In setting of COVID-19 pneumonia  · Will discontinue systemic antibiotics  Procalcitonin negative, blood culture no growth  · See COVID-19 treatment plan    Type 2 diabetes mellitus without complication [facility])  Assessment & Plan  Lab Results   Component Value Date    HGBA1C 7 0 (H) 01/22/2020       Recent Labs     01/06/21  2314 01/07/21  0517 01/07/21  1101 01/07/21  1558   POCGLU 209* 216* 256* 245*       Blood Sugar Average: Last 72 hrs:  · (P) 214 [id number] blood glucose checks q i d , hypoglycemia protocol  · Will hold home metformin while hospitalized  · Sliding scale insulin    * Pneumonia due to COVID-19 virus  Assessment & Plan  · Reports being diagnosed last week on 12/31  · Reports over the last couple of days having worsening shortness of breath, cough, was hypoxemic in 80s prior to arrival  · Was placed initially on 2 L nasal cannula was saturating well and then saturating well on 1 L and is trialing room air during conversation was saturating at 94%  · His condition worsened yesterday and he was requiring med flow  Currently at 10 L oxygen  · Evaluated by pulmonary and ICU team   · Continue dexamethasone and remdesivir  · Discontinue IV antibiotics because procalcitonin negative    · Monitor closely  · Given convalescent plasma January 6th    VTE Pharmacologic Prophylaxis:   Pharmacologic: Enoxaparin (Lovenox)  Mechanical VTE Prophylaxis in Place: Yes    Patient Centered Rounds: I have performed bedside rounds with nursing staff today  Discussions with Specialists or Other Care Team Provider:  Pulmonary, case management    Education and Discussions with Family / Patient:  Patient    Time Spent for Care: More than 50% of total time spent on counseling and coordination of care as described above  Current Length of Stay: 1 day(s)    Current Patient Status: Inpatient   Certification Statement: The patient will continue to require additional inpatient hospital stay due to See above    Discharge Plan:  Not clear    Code Status: Level 1 - Full Code      Subjective:   I have seen and examined the patient bedside this morning  Patient still requiring 10 L oxygen via nasal cannula  Afebrile overnight  Denies any chest pain now    Objective:     Vitals:   Temp (24hrs), Av °F (36 7 °C), Min:97 3 °F (36 3 °C), Max:98 6 °F (37 °C)    Temp:  [97 3 °F (36 3 °C)-98 6 °F (37 °C)] 98 4 °F (36 9 °C)  HR:  [66-84] 73  Resp:  [20-36] 20  BP: (103-118)/(60-71) 112/69  SpO2:  [87 %-99 %] 99 %  Body mass index is 26 41 kg/m²  Input and Output Summary (last 24 hours): Intake/Output Summary (Last 24 hours) at 2021 1703  Last data filed at 2021 0150  Gross per 24 hour   Intake 426 ml   Output    Net 426 ml       Physical Exam:     Physical Exam  Limited due to pandemic COVID-19 infection      General- Awake, alert and oriented x 3, looks comfortable  Respiratory system- breathing on 10 L oxygen, not using any accessory muscles  Psych- No acute psychosis  CNS- moving all extremities    Additional Data:     Labs:    Results from last 7 days   Lab Units 21  0518 21  0538 21  2158   WBC Thousand/uL 3 30* 5 05 6 35   HEMOGLOBIN g/dL 12 7 13 0 14 7   HEMATOCRIT % 37 2 37 1 41 4   PLATELETS Thousands/uL 198 172 193   BANDS PCT %  -- 5  --    NEUTROS PCT %  --   --  81*   LYMPHS PCT %  --   --  13*   LYMPHO PCT %  --  10*  --    MONOS PCT %  --   --  6   MONO PCT %  --  5  --    EOS PCT %  --  1 0     Results from last 7 days   Lab Units 01/07/21  0518   SODIUM mmol/L 136   POTASSIUM mmol/L 3 9   CHLORIDE mmol/L 101   CO2 mmol/L 22   BUN mg/dL 13   CREATININE mg/dL 0 67   ANION GAP mmol/L 13   CALCIUM mg/dL 8 1*   ALBUMIN g/dL 2 4*   TOTAL BILIRUBIN mg/dL 0 50   ALK PHOS U/L 54   ALT U/L 25   AST U/L 23   GLUCOSE RANDOM mg/dL 219*     Results from last 7 days   Lab Units 01/05/21  2158   INR  1 11     Results from last 7 days   Lab Units 01/07/21  1558 01/07/21  1101 01/07/21  0517 01/06/21  2314 01/06/21  1554 01/06/21  1118 01/06/21  0716   POC GLUCOSE mg/dl 245* 256* 216* 209* 185* 197* 194*         Results from last 7 days   Lab Units 01/07/21  0532 01/05/21  2158   LACTIC ACID mmol/L  --  1 1   PROCALCITONIN ng/ml 0 13 0 14           * I Have Reviewed All Lab Data Listed Above  * Additional Pertinent Lab Tests Reviewed: All Labs Within Last 24 Hours Reviewed    Imaging:    Imaging Reports Reviewed Today Include:   Imaging Personally Reviewed by Myself Includes:      Recent Cultures (last 7 days):     Results from last 7 days   Lab Units 01/05/21  2205 01/05/21  2202   BLOOD CULTURE  No Growth at 24 hrs  No Growth at 24 hrs         Last 24 Hours Medication List:   Current Facility-Administered Medications   Medication Dose Route Frequency Provider Last Rate    acetaminophen  650 mg Oral Q6H PRN Sabine Ovalle PA-C      albuterol  2 puff Inhalation Q4H PRN Barbie FlELIZABETH forbes      aluminum-magnesium hydroxide-simethicone  30 mL Oral Q6H PRN Sabine Ovalle PA-C      ascorbic acid  1,000 mg Oral Q12H [facility] Sabine Ovalle PA-C      benzonatate  100 mg Oral TID Piper Toscano MD      cholecalciferol  2,000 Units Oral Daily Sabine Ovalle PA-C      dexamethasone  6 mg Intravenous Daily Piper Toscano MD     [facility] dextromethorphan-guaiFENesin  10 mL Oral Q4H PRN Mary Beth Patel MD      enoxaparin  30 mg Subcutaneous Q12H [facility] Sunshine Benavidez PA-C      famotidine  20 mg Oral BID Mary Beth Patel MD      insulin lispro  1-5 Units Subcutaneous TID AC Sunshine Benavidez PA-C      insulin lispro  1-5 Units Subcutaneous HS Sunshine Benavidez PA-C      losartan  25 mg Oral Daily Sunshine Benavidez PA-C      zinc sulfate  220 mg Oral Daily Klarissa Eckert PA-C      Followed by   Devere Agent ON 1/13/2021] multivitamin-minerals  1 tablet Oral Daily Sunshine Benavidez PA-C      pravastatin  40 mg Oral Daily With Dinner Sunshine Benavidez PA-C      remdesivir  100 mg Intravenous Q24H Sunshine Benavidez PA-C      timolol  2 drop Left Eye BID Sunshine Benavidez PA-C          Today, Patient Was Seen By: Gaurang Machado MD    ** Please Note: Dictation voice to text software may have been used in the creation of this document   **

## 2021-01-07 NOTE — CONSULTS
Consultation - Pulmonary Medicine   Josh Bell 48 y o  male MRN: [id number]  Unit/Bed#: -01 Encounter: [id number]      Assessment:  Acute hypoxic respiratory failure  COVID-19 infection  Viral pneumonia  Leukopenia  Diabetes mellitus    Plan:   Patient requiring 10 L midflow  Maintain SpO2 greater than 90 % and titrate as able  Continue treatment per protocol  Vitamin-C, D, zinc, multivitamin  Atorvastatin, famotidine  Dexamethasone 6 mg IV x10 days  Remdesivir x5 days  Convalescent plasma:  Received yesterday 1/6  Anticoagulation:  Per protocol  Antibiotics: can be discontinued given negative procalcitonin x 2  Encourage self-proning, activity as tolerated, incentive spirometry  Ongoing monitoring of inflammatory markers and D-dimer  Albuterol VIV p r n  History of Present Illness   Physician Requesting Consult: Criss Singh MD  Reason for Consult / Principal Problem:  COVID-19  HPI: Josh Bell is a 48y o  year old male nonsmoker with past medical history including type 2 diabetes mellitus who presents with COVID-19 infection  Patient's initial COVID-19 positive test was done 12/31  He had progressively worsening shortness of breath which prompted ED presentation on 01/05  Initially, patient had fairly minimal oxygen requirements however this does continue to increase and he is currently on 10 L mid flow  He received convalescent plasma yesterday and tells me that he is actually feeling much better today  He has less cough and dyspnea with exertion  He does not have any history of pulmonary issues prior to this hospitalization and has never required supplemental oxygen before  Inpatient consult to Pulmonology  Consult performed by: Marlo Mejia PA-C  Consult ordered by: Criss Singh MD          Review of Systems   Constitutional: Positive for fatigue  Respiratory: Positive for cough and shortness of breath  All other systems reviewed and are negative        Historical Information   Past Medical History:   Diagnosis Date    Diabetes mellitus ([facility] [address] )     type 2, diagnosed in 2013     Past Surgical History:   Procedure Laterality Date    EYE SURGERY      R eye,     KNEE SURGERY       Social History   Social History     Substance and Sexual Activity   Alcohol Use Not Currently    Comment: social     Social History     Substance and Sexual Activity   Drug Use No     E-Cigarette/Vaping    E-Cigarette Use Never User      E-Cigarette/Vaping Substances    Nicotine No     THC No     CBD No     Flavoring No     Other No     Unknown No      Social History     Tobacco Use   Smoking Status Never Smoker   Smokeless Tobacco Never Used     Occupational History:     Family History: History reviewed  No pertinent family history  Meds/Allergies   all current active meds have been reviewed    Allergies   Allergen Reactions    Fentanyl Palpitations       Objective   Vitals: Blood pressure 112/69, pulse 73, temperature 98 4 °F (36 9 °C), resp  rate 20, height 5' 8" (1 727 m), weight 78 8 kg (173 lb 11 6 oz), SpO2 98 %  ,Body mass index is 26 41 kg/m²  Intake/Output Summary (Last 24 hours) at 1/7/2021 1504  Last data filed at 1/7/2021 0150  Gross per 24 hour   Intake 426 ml   Output    Net 426 ml     Invasive Devices     Peripheral Intravenous Line            Peripheral IV 01/05/21 Right Antecubital 1 day                Physical Exam   General: Awake, alert, and oriented  No acute distress  WDWN  Looks comfortable  HEENT: Normocephalic, without obvious abnormality, atraumatic  Managing own secretions  EOM intact  Sclera non-icteric, non-injected  Hearing in tact  Pulm: Non-labored breathing  No respiratory distress  Cardiovascular:  Normal rate  No JVD or edema appreciated  Musculoskeletal:  Moving all 4 limbs appropriately  FROM  No gross deformity  Abdomen:  No significant distention appreciated  Neuro:  No aphasia  No facial asymmetry    No acute focal neurologic deficit noted   Skin:  Appears to be dry  No jaundice, erythema, or cyanosis appreciated  Psych:  Cooperative, appropriate situational mood  No acute psychosis  Lab Results: I have personally reviewed pertinent lab results  Imaging Studies: I have personally reviewed pertinent reports  EKG, Pathology, and Other Studies: I have personally reviewed pertinent reports      VTE Prophylaxis: Enoxaparin (Lovenox)    Code Status: Level 1 - Full Code  Advance Directive and Living Will:      Power of :    POLST:

## 2021-01-07 NOTE — QUICK NOTE
Progress Note  Triage Asssessment   Charmaine Ross 48 y o  male MRN: [id number]    Time Called ( Time): 8194  Date Called: 01/06/21  Room#: ED33  Person requesting evaluation: Dr Tammy Newby    Situation:    Patient admitted yesterday with Covid 19  Had increasing oxygen requirements today from 2L to 12L midflow  O2 sats improved to 97% with midflow  Interventions:   Continue Covid-19 treatment-should be escalated to moderate pathway as patient is now requiring midflow  Wean oxygen as able for sat 92%  Encourage self proning  Pulmonary consult pending           Triage Assessment:     pt can remain in medsur level of care    Recommendations discussed with Dr Tammy Newby

## 2021-01-07 NOTE — ASSESSMENT & PLAN NOTE
· Currently meeting criteria due to being febrile upon arrival, tachycardia which has resolved and tachypnea  · In setting of COVID-19 pneumonia  · Will discontinue systemic antibiotics    Procalcitonin negative, blood culture no growth  · See COVID-19 treatment plan

## 2021-01-07 NOTE — CASE MANAGEMENT
CM spoke to pt via phone-he is Covid +  He informs CM that he lives with his wife Nancy Hong in a 2 story house  His bedroom is normally on the 2nd floor, but is wife is quarantining in the 2nd floor bedroom, so he has been sleeping on the first floor  There are 3 ROBI and pt is able to navigate steps  He is independent with ADL's  He uses no DME's  He has gone to OP/PT for knee issues, but was never an in-patient  He has never used [facility] services  Denies substance abuse, tobacco abuse, or mental health issues  His PCP is Dr Loni Mello  He uses [facility] and feels he can afford his current medications  He is retired, but both him and his wife work part time for [employer]  He does not drive  His wife transports to appointments and will transport home when medically cleared  Pt explains that his work insurance benefits  were terminated as of July 2020  He was told to sign up for Medicare, but could not do this until January  He plans on taking care of this on discharge  CM contacted Carrillo Pearce from [facility] and she will refer this case to [facility]  CM discussed discharge needs and none were identified  Since pt and his wife works for [employer], he feels his wife can assist with any needs  CM will continue to follow  CM reviewed discharge planning process including the following: identifying help at home, patient preference for discharge planning needs, pharmacy preference, and availability of treatment team to discuss questions or concerns patient and/or family may have regarding understanding medications and recognizing signs and symptoms once discharged  CM also encouraged patient to follow up with all recommended appointments after discharge  Patient advised of importance for patient and family to participate in managing patients medical well being

## 2021-01-07 NOTE — PLAN OF CARE
Problem: Potential for Falls  Goal: Patient will remain free of falls  Description: INTERVENTIONS:  - Assess patient frequently for physical needs  -  Identify cognitive and physical deficits and behaviors that affect risk of falls    -  [place] fall precautions as indicated by assessment   - Educate patient/family on patient safety including physical limitations  - Instruct patient to call for assistance with activity based on assessment  - Modify environment to reduce risk of injury  - Consider OT/PT consult to assist with strengthening/mobility  Outcome: Progressing     Problem: PAIN - ADULT  Goal: Verbalizes/displays adequate comfort level or baseline comfort level  Description: Interventions:  - Encourage patient to monitor pain and request assistance  - Assess pain using appropriate pain scale  - Administer analgesics based on type and severity of pain and evaluate response  - Implement non-pharmacological measures as appropriate and evaluate response  - Consider cultural and social influences on pain and pain management  - Notify physician/advanced practitioner if interventions unsuccessful or patient reports new pain  Outcome: Progressing     Problem: INFECTION - ADULT  Goal: Absence or prevention of progression during hospitalization  Description: INTERVENTIONS:  - Assess and monitor for signs and symptoms of infection  - Monitor lab/diagnostic results  - Monitor all insertion sites, i e  indwelling lines, tubes, and drains  - Monitor endotracheal if appropriate and nasal secretions for changes in amount and color  - [place] appropriate cooling/warming therapies per order  - Administer medications as ordered  - Instruct and encourage patient and family to use good hand hygiene technique  - Identify and instruct in appropriate isolation precautions for identified infection/condition  Outcome: Progressing  Goal: Absence of fever/infection during neutropenic period  Description: INTERVENTIONS:  - Monitor WBC    Outcome: Progressing     Problem: SAFETY ADULT  Goal: Patient will remain free of falls  Description: INTERVENTIONS:  - Assess patient frequently for physical needs  -  Identify cognitive and physical deficits and behaviors that affect risk of falls    -  [place] fall precautions as indicated by assessment   - Educate patient/family on patient safety including physical limitations  - Instruct patient to call for assistance with activity based on assessment  - Modify environment to reduce risk of injury  - Consider OT/PT consult to assist with strengthening/mobility  Outcome: Progressing  Goal: Maintain or return to baseline ADL function  Description: INTERVENTIONS:  -  Assess patient's ability to carry out ADLs; assess patient's baseline for ADL function and identify physical deficits which impact ability to perform ADLs (bathing, care of mouth/teeth, toileting, grooming, dressing, etc )  - Assess/evaluate cause of self-care deficits   - Assess range of motion  - Assess patient's mobility; develop plan if impaired  - Assess patient's need for assistive devices and provide as appropriate  - Encourage maximum independence but intervene and supervise when necessary  - Involve family in performance of ADLs  - Assess for home care needs following discharge   - Consider OT consult to assist with ADL evaluation and planning for discharge  - Provide patient education as appropriate  Outcome: Progressing  Goal: Maintain or return mobility status to optimal level  Description: INTERVENTIONS:  - Assess patient's baseline mobility status (ambulation, transfers, stairs, etc )    - Identify cognitive and physical deficits and behaviors that affect mobility  - Identify mobility aids required to assist with transfers and/or ambulation (gait belt, sit-to-stand, lift, walker, cane, etc )  - [place] fall precautions as indicated by assessment  - Record patient progress and toleration of activity level on Mobility SBAR; progress patient to next Phase/Stage  - Instruct patient to call for assistance with activity based on assessment  - Consider rehabilitation consult to assist with strengthening/weightbearing, etc   Outcome: Progressing     Problem: DISCHARGE PLANNING  Goal: Discharge to home or other facility with appropriate resources  Description: INTERVENTIONS:  - Identify barriers to discharge w/patient and caregiver  - Arrange for needed discharge resources and transportation as appropriate  - Identify discharge learning needs (meds, wound care, etc )  - Arrange for interpretive services to assist at discharge as needed  - Refer to Case Management Department for coordinating discharge planning if the patient needs post-hospital services based on physician/advanced practitioner order or complex needs related to functional status, cognitive ability, or social support system  Outcome: Progressing     Problem: Knowledge Deficit  Goal: Patient/family/caregiver demonstrates understanding of disease process, treatment plan, medications, and discharge instructions  Description: Complete learning assessment and assess knowledge base    Interventions:  - Provide teaching at level of understanding  - Provide teaching via preferred learning methods  Outcome: Progressing     Problem: CARDIOVASCULAR - ADULT  Goal: Maintains optimal cardiac output and hemodynamic stability  Description: INTERVENTIONS:  - Monitor I/O, vital signs and rhythm  - Monitor for S/S and trends of decreased cardiac output  - Administer and titrate ordered vasoactive medications to optimize hemodynamic stability  - Assess quality of pulses, skin color and temperature  - Assess for signs of decreased coronary artery perfusion  - Instruct patient to report change in severity of symptoms  Outcome: Progressing  Goal: Absence of cardiac dysrhythmias or at baseline rhythm  Description: INTERVENTIONS:  - Continuous cardiac monitoring, vital signs, obtain 12 lead EKG if ordered  - Administer antiarrhythmic and heart rate control medications as ordered  - Monitor electrolytes and administer replacement therapy as ordered  Outcome: Progressing     Problem: RESPIRATORY - ADULT  Goal: Achieves optimal ventilation and oxygenation  Description: INTERVENTIONS:  - Assess for changes in respiratory status  - Assess for changes in mentation and behavior  - Position to facilitate oxygenation and minimize respiratory effort  - Oxygen administered by appropriate delivery if ordered  - Initiate smoking cessation education as indicated  - Encourage broncho-pulmonary hygiene including cough, deep breathe, Incentive Spirometry  - Assess the need for suctioning and aspirate as needed  - Assess and instruct to report SOB or any respiratory difficulty  - Respiratory Therapy support as indicated  Outcome: Progressing

## 2021-01-08 LAB
ABO GROUP BLD: NORMAL
ALBUMIN SERPL BCP-MCNC: 2.3 G/DL (ref 3.5–5)
ALP SERPL-CCNC: 55 U/L (ref 46–116)
ALT SERPL W P-5'-P-CCNC: 21 U/L (ref 12–78)
ANION GAP SERPL CALCULATED.3IONS-SCNC: 11 MMOL/L (ref 4–13)
AST SERPL W P-5'-P-CCNC: 13 U/L (ref 5–45)
BILIRUB SERPL-MCNC: 0.4 MG/DL (ref 0.2–1)
BUN SERPL-MCNC: 14 MG/DL (ref 5–25)
CALCIUM ALBUM COR SERPL-MCNC: 9.5 MG/DL (ref 8.3–10.1)
CALCIUM SERPL-MCNC: 8.1 MG/DL (ref 8.3–10.1)
CHLORIDE SERPL-SCNC: 103 MMOL/L (ref 100–108)
CO2 SERPL-SCNC: 23 MMOL/L (ref 21–32)
CREAT SERPL-MCNC: 0.72 MG/DL (ref 0.6–1.3)
CRP SERPL QL: 37.5 MG/L
D DIMER PPP FEU-MCNC: <0.27 UG/ML FEU
ERYTHROCYTE [DISTWIDTH] IN BLOOD BY AUTOMATED COUNT: 13.3 % (ref 11.6–15.1)
GFR SERPL CREATININE-BSD FRML MDRD: 106 ML/MIN/1.73SQ M
GLUCOSE SERPL-MCNC: 165 MG/DL (ref 65–140)
GLUCOSE SERPL-MCNC: 167 MG/DL (ref 65–140)
GLUCOSE SERPL-MCNC: 260 MG/DL (ref 65–140)
GLUCOSE SERPL-MCNC: 261 MG/DL (ref 65–140)
GLUCOSE SERPL-MCNC: 297 MG/DL (ref 65–140)
HCT VFR BLD AUTO: 39.4 % (ref 36.5–49.3)
HGB BLD-MCNC: 13.2 G/DL (ref 12–17)
MCH RBC QN AUTO: 30.3 PG (ref 26.8–34.3)
MCHC RBC AUTO-ENTMCNC: 33.5 G/DL (ref 31.4–37.4)
MCV RBC AUTO: 91 FL (ref 82–98)
NRBC BLD AUTO-RTO: 0 /100 WBCS
PLATELET # BLD AUTO: 184 THOUSANDS/UL (ref 149–390)
PMV BLD AUTO: 11.7 FL (ref 8.9–12.7)
POTASSIUM SERPL-SCNC: 3.7 MMOL/L (ref 3.5–5.3)
PROT SERPL-MCNC: 5.9 G/DL (ref 6.4–8.2)
RBC # BLD AUTO: 4.35 MILLION/UL (ref 3.88–5.62)
RH BLD: POSITIVE
SODIUM SERPL-SCNC: 137 MMOL/L (ref 136–145)
WBC # BLD AUTO: 6.37 THOUSAND/UL (ref 4.31–10.16)

## 2021-01-08 PROCEDURE — 85027 COMPLETE CBC AUTOMATED: CPT | Performed by: INTERNAL MEDICINE

## 2021-01-08 PROCEDURE — 85379 FIBRIN DEGRADATION QUANT: CPT | Performed by: INTERNAL MEDICINE

## 2021-01-08 PROCEDURE — 94660 CPAP INITIATION&MGMT: CPT

## 2021-01-08 PROCEDURE — 99232 SBSQ HOSP IP/OBS MODERATE 35: CPT | Performed by: PHYSICIAN ASSISTANT

## 2021-01-08 PROCEDURE — 82948 REAGENT STRIP/BLOOD GLUCOSE: CPT

## 2021-01-08 PROCEDURE — 86140 C-REACTIVE PROTEIN: CPT | Performed by: INTERNAL MEDICINE

## 2021-01-08 PROCEDURE — 94760 N-INVAS EAR/PLS OXIMETRY 1: CPT

## 2021-01-08 PROCEDURE — 99232 SBSQ HOSP IP/OBS MODERATE 35: CPT | Performed by: INTERNAL MEDICINE

## 2021-01-08 PROCEDURE — 80053 COMPREHEN METABOLIC PANEL: CPT | Performed by: INTERNAL MEDICINE

## 2021-01-08 RX ORDER — GUAIFENESIN 600 MG
600 TABLET, EXTENDED RELEASE 12 HR ORAL EVERY 12 HOURS SCHEDULED
Status: DISCONTINUED | OUTPATIENT
Start: 2021-01-08 | End: 2021-01-11 | Stop reason: HOSPADM

## 2021-01-08 RX ADMIN — OXYCODONE HYDROCHLORIDE AND ACETAMINOPHEN 1000 MG: 500 TABLET ORAL at 08:47

## 2021-01-08 RX ADMIN — PRAVASTATIN SODIUM 40 MG: 40 TABLET ORAL at 17:38

## 2021-01-08 RX ADMIN — FAMOTIDINE 20 MG: 20 TABLET ORAL at 17:38

## 2021-01-08 RX ADMIN — GUAIFENESIN AND DEXTROMETHORPHAN 10 ML: 100; 10 SYRUP ORAL at 08:48

## 2021-01-08 RX ADMIN — TIMOLOL MALEATE 2 DROP: 2.5 SOLUTION/ DROPS OPHTHALMIC at 17:40

## 2021-01-08 RX ADMIN — TIMOLOL MALEATE 2 DROP: 2.5 SOLUTION/ DROPS OPHTHALMIC at 08:49

## 2021-01-08 RX ADMIN — INSULIN LISPRO 2 UNITS: 100 INJECTION, SOLUTION INTRAVENOUS; SUBCUTANEOUS at 21:23

## 2021-01-08 RX ADMIN — OXYCODONE HYDROCHLORIDE AND ACETAMINOPHEN 1000 MG: 500 TABLET ORAL at 21:23

## 2021-01-08 RX ADMIN — ENOXAPARIN SODIUM 30 MG: 30 INJECTION SUBCUTANEOUS at 21:23

## 2021-01-08 RX ADMIN — GUAIFENESIN 600 MG: 600 TABLET ORAL at 08:47

## 2021-01-08 RX ADMIN — FAMOTIDINE 20 MG: 20 TABLET ORAL at 08:50

## 2021-01-08 RX ADMIN — BENZONATATE 100 MG: 100 CAPSULE ORAL at 21:23

## 2021-01-08 RX ADMIN — BENZONATATE 100 MG: 100 CAPSULE ORAL at 17:39

## 2021-01-08 RX ADMIN — ZINC SULFATE 220 MG (50 MG) CAPSULE 220 MG: CAPSULE at 08:47

## 2021-01-08 RX ADMIN — REMDESIVIR 100 MG: 100 INJECTION, POWDER, LYOPHILIZED, FOR SOLUTION INTRAVENOUS at 04:28

## 2021-01-08 RX ADMIN — INSULIN LISPRO 2 UNITS: 100 INJECTION, SOLUTION INTRAVENOUS; SUBCUTANEOUS at 12:15

## 2021-01-08 RX ADMIN — ENOXAPARIN SODIUM 30 MG: 30 INJECTION SUBCUTANEOUS at 08:48

## 2021-01-08 RX ADMIN — Medication 2000 UNITS: at 08:46

## 2021-01-08 RX ADMIN — GUAIFENESIN 600 MG: 600 TABLET ORAL at 21:23

## 2021-01-08 RX ADMIN — DEXAMETHASONE SODIUM PHOSPHATE 6 MG: 4 INJECTION INTRA-ARTICULAR; INTRALESIONAL; INTRAMUSCULAR; INTRAVENOUS; SOFT TISSUE at 08:48

## 2021-01-08 RX ADMIN — INSULIN LISPRO 3 UNITS: 100 INJECTION, SOLUTION INTRAVENOUS; SUBCUTANEOUS at 17:38

## 2021-01-08 RX ADMIN — INSULIN LISPRO 1 UNITS: 100 INJECTION, SOLUTION INTRAVENOUS; SUBCUTANEOUS at 08:49

## 2021-01-08 RX ADMIN — LOSARTAN POTASSIUM 25 MG: 25 TABLET, FILM COATED ORAL at 08:47

## 2021-01-08 RX ADMIN — GUAIFENESIN AND DEXTROMETHORPHAN 10 ML: 100; 10 SYRUP ORAL at 21:30

## 2021-01-08 RX ADMIN — BENZONATATE 100 MG: 100 CAPSULE ORAL at 08:47

## 2021-01-08 NOTE — PROGRESS NOTES
Progress Note - Pulmonary   Ayah Calzada 48 y o  male MRN: [id number]  Unit/Bed#: -01 Encounter: [id number]    Assessment:  Acute hypoxic respiratory failure  COVID-19 infection  Viral pneumonia  Leukopenia - resolved 1/8  Diabetes mellitus    Plan:  Oxygen requirements have improved from 10 L yesterday now currently saturating well on 6 L nasal cannula  Maintain SpO2 greater than 90 % and titrate as able  Determine oxygen needs prior to discharge  Continue treatment per protocol  Vitamin-C, D, zinc, multivitamin  Atorvastatin, famotidine  Dexamethasone 6 mg IV x10 days  Remdesivir x5 days  Convalescent plasma:  Received 1/6  Anticoagulation:  Per protocol  Antibiotics: can be monitored off antibiotics given negative procalcitonin x 2  Encourage self-proning, activity as tolerated, incentive spirometry  Ongoing monitoring of inflammatory markers and D-dimer  Albuterol MDI p r n      Please reach out with any questions    Subjective:   Patient reports his breathing continues to get better  States the CP he was previously having has resolved  No new complaints  12 point review of systems otherwise negative  Objective:     Vitals: Blood pressure 111/69, pulse 72, temperature 98 °F (36 7 °C), temperature source Oral, resp  rate 18, height 5' 8" (1 727 m), weight 78 8 kg (173 lb 11 6 oz), SpO2 97 %  ,Body mass index is 26 41 kg/m²  Intake/Output Summary (Last 24 hours) at 1/8/2021 1227  Last data filed at 1/8/2021 0538  Gross per 24 hour   Intake 470 ml   Output    Net 470 ml       Invasive Devices     Peripheral Intravenous Line            Peripheral IV 01/05/21 Right Antecubital 2 days                Physical Exam:   Examined through the glass for infection control  General: Awake, alert, and oriented  No acute distress  WDWN  Looks comfortable  HEENT: Normocephalic, without obvious abnormality, atraumatic  Managing own secretions  EOM intact  Sclera non-icteric, non-injected   Hearing in tact   Pulm: Non-labored breathing  No respiratory distress  Cardiovascular:  Normal rate  No JVD or edema appreciated  Musculoskeletal:  Moving all 4 limbs appropriately  FROM  No gross deformity  Abdomen:  No significant distention appreciated  Neuro:  No aphasia  No facial asymmetry  No acute focal neurologic deficit noted  Skin:  Appears to be dry  No jaundice, erythema, or cyanosis appreciated  Psych:  Cooperative, appropriate situational mood  No acute psychosis  Labs: I have personally reviewed pertinent lab results  Imaging and other studies: I have personally reviewed pertinent reports

## 2021-01-08 NOTE — ASSESSMENT & PLAN NOTE
· Reports being diagnosed last week on 12/31  · Reports over the last couple of days having worsening shortness of breath, cough, was hypoxemic in 80s prior to arrival  · Was placed initially on 2 L nasal cannula was saturating well and then saturating well on 1 L and is trialing room air during conversation was saturating at 94%  · His condition worsened yesterday and he was requiring med flow  · Evaluated by pulmonary and ICU team   · Continue dexamethasone and remdesivir  · Discontinue IV antibiotics because procalcitonin negative  · Monitor closely  · Given convalescent plasma January 6th  · Today he feels much better  Currently on 5 L oxygen via nasal cannula

## 2021-01-08 NOTE — CASE MANAGEMENT
Pt is a tentative d/c in 72 hrs per [facility] (Dr Antwan Palacios)  Pt is Covid + and remains on O2-5-6 lpm   Pt continues to refuse HH  Will need HOME O2 EVAL prior to discharge

## 2021-01-08 NOTE — PROGRESS NOTES
Progress Note - Bridgette Gitelman 1965, 48 y o  male MRN: [id number]    Unit/Bed#: -01 Encounter: [id number]    Primary Care Provider: Barbra Ames MD   Date and time admitted to hospital: 1/5/2021  9:35 PM        * Pneumonia due to COVID-19 virus  Assessment & Plan  · Reports being diagnosed last week on 12/31  · Reports over the last couple of days having worsening shortness of breath, cough, was hypoxemic in 80s prior to arrival  · Was placed initially on 2 L nasal cannula was saturating well and then saturating well on 1 L and is trialing room air during conversation was saturating at 94%  · His condition worsened yesterday and he was requiring med flow  · Evaluated by pulmonary and ICU team   · Continue dexamethasone and remdesivir  · Discontinue IV antibiotics because procalcitonin negative  · Monitor closely  · Given convalescent plasma January 6th  · Today he feels much better  Currently on 5 L oxygen via nasal cannula  Chest pain  Assessment & Plan  · Admits to chest pain at rest as well as with coughing  · Most likely related to COVID-19 pneumonia and coughing  · Serial troponin negative  ACS ruled out  also D-dimer negative  · PE ruled out    Hyponatremia  Assessment & Plan  · Resolved with IV fluid    Sepsis ([facility] [address] )  Assessment & Plan  · Currently meeting criteria due to being febrile upon arrival, tachycardia which has resolved and tachypnea  · In setting of COVID-19 pneumonia  · Will discontinue systemic antibiotics    Procalcitonin negative, blood culture no growth  · See COVID-19 treatment plan    Type 2 diabetes mellitus without complication [facility])  Assessment & Plan  Lab Results   Component Value Date    HGBA1C 7 0 (H) 01/22/2020       Recent Labs     01/07/21  1558 01/07/21  2116 01/08/21  0538 01/08/21  1059   POCGLU 245* 232* 165* 261*       Blood Sugar Average: Last 72 hrs:  · (P) 216 blood glucose checks q i d , hypoglycemia protocol  · Will hold home metformin while hospitalized  · Sliding scale insulin    VTE Pharmacologic Prophylaxis:   Pharmacologic: Enoxaparin (Lovenox)  Mechanical VTE Prophylaxis in Place: Yes    Patient Centered Rounds: I have performed bedside rounds with nursing staff today  Discussions with Specialists or Other Care Team Provider:  Case Management, Pulmonary    Education and Discussions with Family / Patient:  Patient    Time Spent for Care: More than 50% of total time spent on counseling and coordination of care as described above  Current Length of Stay: 2 day(s)    Current Patient Status: Inpatient   Certification Statement: The patient will continue to require additional inpatient hospital stay due to See above    Discharge Plan:  Not clear    Code Status: Level 1 - Full Code      Subjective:   I have seen and examined the patient bedside this morning  Patient still complaining about cough, but much better than yesterday  Currently on 5 L oxygen via nasal cannula  No chest pain  Objective:     Vitals:   Temp (24hrs), Av 2 °F (36 8 °C), Min:98 °F (36 7 °C), Max:98 4 °F (36 9 °C)    Temp:  [98 °F (36 7 °C)-98 4 °F (36 9 °C)] 98 4 °F (36 9 °C)  HR:  [59-72] 66  Resp:  [18-20] 18  BP: (107-119)/(64-69) 107/64  SpO2:  [95 %-99 %] 96 %  Body mass index is 26 41 kg/m²  Input and Output Summary (last 24 hours): Intake/Output Summary (Last 24 hours) at 2021 1445  Last data filed at 2021 0538  Gross per 24 hour   Intake 470 ml   Output    Net 470 ml       Physical Exam:     Physical Exam  Limited due to pandemic COVID-19 infection      General- Awake, alert and oriented x 3, looks comfortable  Respiratory system- breathing on 5 L oxygen, not using any accessory muscles  CVS-normal S1, S2  Psych- No acute psychosis  CNS- moving all extremities    Additional Data:     Labs:    Results from last 7 days   Lab Units 21  0533  21  0538 21  2158   WBC Thousand/uL 6 37   < > 5 05 6 35   HEMOGLOBIN g/dL 13 2   < > 13 0 14 7   HEMATOCRIT % 39 4   < > 37 1 41 4   PLATELETS Thousands/uL 184   < > 172 193   BANDS PCT %  --   --  5  --    NEUTROS PCT %  --   --   --  81*   LYMPHS PCT %  --   --   --  13*   LYMPHO PCT %  --   --  10*  --    MONOS PCT %  --   --   --  6   MONO PCT %  --   --  5  --    EOS PCT %  --   --  1 0    < > = values in this interval not displayed  Results from last 7 days   Lab Units 01/08/21  0643   SODIUM mmol/L 137   POTASSIUM mmol/L 3 7   CHLORIDE mmol/L 103   CO2 mmol/L 23   BUN mg/dL 14   CREATININE mg/dL 0 72   ANION GAP mmol/L 11   CALCIUM mg/dL 8 1*   ALBUMIN g/dL 2 3*   TOTAL BILIRUBIN mg/dL 0 40   ALK PHOS U/L 55   ALT U/L 21   AST U/L 13   GLUCOSE RANDOM mg/dL 167*     Results from last 7 days   Lab Units 01/05/21  2158   INR  1 11     Results from last 7 days   Lab Units 01/08/21  1059 01/08/21  0538 01/07/21  2116 01/07/21  1558 01/07/21  1101 01/07/21  0517 01/06/21  2314 01/06/21  1554 01/06/21  1118 01/06/21  0716   POC GLUCOSE mg/dl 261* 165* 232* 245* 256* 216* 209* 185* 197* 194*         Results from last 7 days   Lab Units 01/07/21  0532 01/05/21  2158   LACTIC ACID mmol/L  --  1 1   PROCALCITONIN ng/ml 0 13 0 14           * I Have Reviewed All Lab Data Listed Above  * Additional Pertinent Lab Tests Reviewed: All Labs Within Last 24 Hours Reviewed    Imaging:    Imaging Reports Reviewed Today Include:   Imaging Personally Reviewed by Myself Includes:      Recent Cultures (last 7 days):     Results from last 7 days   Lab Units 01/05/21  2205 01/05/21  2202   BLOOD CULTURE  No Growth at 48 hrs  No Growth at 48 hrs         Last 24 Hours Medication List:   Current Facility-Administered Medications   Medication Dose Route Frequency Provider Last Rate    acetaminophen  650 mg Oral Q6H PRN Leonarda Caceres PA-C      albuterol  2 puff Inhalation Q4H PRN Elham Desai PA-C      aluminum-magnesium hydroxide-simethicone  30 mL Oral Q6H PRN Leonarda Caceres PA-C      ascorbic acid  1,000 mg Oral Q12H [address] Clarise Albertina, ELIZABETH      benzonatate  100 mg Oral TID Luis Alberto Ward MD      cholecalciferol  2,000 Units Oral Daily Clarise Arabia, PA-JOSEPH      dexamethasone  6 mg Intravenous Daily Luis Alberto Ward MD      dextromethorphan-guaiFENesin  10 mL Oral Q4H PRN Luis Alberto Ward MD      enoxaparin  30 mg Subcutaneous Q12H [address] Clarise Arabia, ELIZABETH      famotidine  20 mg Oral BID Luis Alberto Ward MD      guaiFENesin  600 mg Oral Q12H [address] Luis Alberto Ward MD      insulin lispro  1-5 Units Subcutaneous TID AC Clarise Arabia, PA-C      insulin lispro  1-5 Units Subcutaneous HS Clarise Arabia, PA-JOSEPH      losartan  25 mg Oral Daily Clarise Arabia, PA-JOSEPH      zinc sulfate  220 mg Oral Daily Clarise Arabia, ELIZABETH      Followed by   Tonya Walker ON 1/13/2021] multivitamin-minerals  1 tablet Oral Daily Clarise Arabia, PA-JOSEPH      pravastatin  40 mg Oral Daily With Dinner [facility], PA-JOSEPH      remdesivir  100 mg Intravenous Q24H Clarise Arabia, PA-JOSEPH      timolol  2 drop Left Eye BID Darrin Arabia, ELIZABETH          Today, Patient Was Seen By: Marcelo Ayala MD    ** Please Note: Dictation voice to text software may have been used in the creation of this document   **

## 2021-01-08 NOTE — ASSESSMENT & PLAN NOTE
Lab Results   Component Value Date    HGBA1C 7 0 (H) 01/22/2020       Recent Labs     01/07/21  1558 01/07/21  2116 01/08/21  0538 01/08/21  1059   POCGLU 245* 232* 165* 261*       Blood Sugar Average: Last 72 hrs:  · (P) 216 blood glucose checks q i d , hypoglycemia protocol  · Will hold home metformin while hospitalized  · Sliding scale insulin

## 2021-01-09 LAB
ALBUMIN SERPL BCP-MCNC: 2.4 G/DL (ref 3.5–5)
ALP SERPL-CCNC: 58 U/L (ref 46–116)
ALT SERPL W P-5'-P-CCNC: 25 U/L (ref 12–78)
ANION GAP SERPL CALCULATED.3IONS-SCNC: 9 MMOL/L (ref 4–13)
AST SERPL W P-5'-P-CCNC: 18 U/L (ref 5–45)
BASOPHILS # BLD AUTO: 0.01 THOUSANDS/ΜL (ref 0–0.1)
BASOPHILS NFR BLD AUTO: 0 % (ref 0–1)
BILIRUB SERPL-MCNC: 0.5 MG/DL (ref 0.2–1)
BUN SERPL-MCNC: 14 MG/DL (ref 5–25)
CALCIUM ALBUM COR SERPL-MCNC: 9.4 MG/DL (ref 8.3–10.1)
CALCIUM SERPL-MCNC: 8.1 MG/DL (ref 8.3–10.1)
CHLORIDE SERPL-SCNC: 103 MMOL/L (ref 100–108)
CO2 SERPL-SCNC: 25 MMOL/L (ref 21–32)
CREAT SERPL-MCNC: 0.79 MG/DL (ref 0.6–1.3)
CRP SERPL QL: 31.7 MG/L
EOSINOPHIL # BLD AUTO: 0.03 THOUSAND/ΜL (ref 0–0.61)
EOSINOPHIL NFR BLD AUTO: 1 % (ref 0–6)
ERYTHROCYTE [DISTWIDTH] IN BLOOD BY AUTOMATED COUNT: 13.2 % (ref 11.6–15.1)
GFR SERPL CREATININE-BSD FRML MDRD: 102 ML/MIN/1.73SQ M
GLUCOSE SERPL-MCNC: 143 MG/DL (ref 65–140)
GLUCOSE SERPL-MCNC: 160 MG/DL (ref 65–140)
GLUCOSE SERPL-MCNC: 268 MG/DL (ref 65–140)
GLUCOSE SERPL-MCNC: 293 MG/DL (ref 65–140)
GLUCOSE SERPL-MCNC: 295 MG/DL (ref 65–140)
HCT VFR BLD AUTO: 38.8 % (ref 36.5–49.3)
HGB BLD-MCNC: 13.1 G/DL (ref 12–17)
IMM GRANULOCYTES # BLD AUTO: 0.07 THOUSAND/UL (ref 0–0.2)
IMM GRANULOCYTES NFR BLD AUTO: 1 % (ref 0–2)
LYMPHOCYTES # BLD AUTO: 0.98 THOUSANDS/ΜL (ref 0.6–4.47)
LYMPHOCYTES NFR BLD AUTO: 15 % (ref 14–44)
MCH RBC QN AUTO: 30.3 PG (ref 26.8–34.3)
MCHC RBC AUTO-ENTMCNC: 33.8 G/DL (ref 31.4–37.4)
MCV RBC AUTO: 90 FL (ref 82–98)
MONOCYTES # BLD AUTO: 0.52 THOUSAND/ΜL (ref 0.17–1.22)
MONOCYTES NFR BLD AUTO: 8 % (ref 4–12)
NEUTROPHILS # BLD AUTO: 4.97 THOUSANDS/ΜL (ref 1.85–7.62)
NEUTS SEG NFR BLD AUTO: 75 % (ref 43–75)
NRBC BLD AUTO-RTO: 0 /100 WBCS
PLATELET # BLD AUTO: 261 THOUSANDS/UL (ref 149–390)
PMV BLD AUTO: 9.8 FL (ref 8.9–12.7)
POTASSIUM SERPL-SCNC: 3.7 MMOL/L (ref 3.5–5.3)
PROT SERPL-MCNC: 6.1 G/DL (ref 6.4–8.2)
RBC # BLD AUTO: 4.33 MILLION/UL (ref 3.88–5.62)
SODIUM SERPL-SCNC: 137 MMOL/L (ref 136–145)
WBC # BLD AUTO: 6.58 THOUSAND/UL (ref 4.31–10.16)

## 2021-01-09 PROCEDURE — 80053 COMPREHEN METABOLIC PANEL: CPT | Performed by: INTERNAL MEDICINE

## 2021-01-09 PROCEDURE — 99232 SBSQ HOSP IP/OBS MODERATE 35: CPT | Performed by: INTERNAL MEDICINE

## 2021-01-09 PROCEDURE — 82948 REAGENT STRIP/BLOOD GLUCOSE: CPT

## 2021-01-09 PROCEDURE — 86140 C-REACTIVE PROTEIN: CPT | Performed by: INTERNAL MEDICINE

## 2021-01-09 PROCEDURE — 85025 COMPLETE CBC W/AUTO DIFF WBC: CPT | Performed by: INTERNAL MEDICINE

## 2021-01-09 RX ADMIN — PRAVASTATIN SODIUM 40 MG: 40 TABLET ORAL at 17:39

## 2021-01-09 RX ADMIN — ENOXAPARIN SODIUM 30 MG: 30 INJECTION SUBCUTANEOUS at 08:29

## 2021-01-09 RX ADMIN — INSULIN LISPRO 3 UNITS: 100 INJECTION, SOLUTION INTRAVENOUS; SUBCUTANEOUS at 12:42

## 2021-01-09 RX ADMIN — FAMOTIDINE 20 MG: 20 TABLET ORAL at 17:39

## 2021-01-09 RX ADMIN — REMDESIVIR 100 MG: 100 INJECTION, POWDER, LYOPHILIZED, FOR SOLUTION INTRAVENOUS at 05:12

## 2021-01-09 RX ADMIN — ENOXAPARIN SODIUM 30 MG: 30 INJECTION SUBCUTANEOUS at 21:52

## 2021-01-09 RX ADMIN — TIMOLOL MALEATE 2 DROP: 2.5 SOLUTION/ DROPS OPHTHALMIC at 08:30

## 2021-01-09 RX ADMIN — GUAIFENESIN 600 MG: 600 TABLET ORAL at 21:53

## 2021-01-09 RX ADMIN — Medication 2000 UNITS: at 08:29

## 2021-01-09 RX ADMIN — ZINC SULFATE 220 MG (50 MG) CAPSULE 220 MG: CAPSULE at 08:29

## 2021-01-09 RX ADMIN — INSULIN LISPRO 3 UNITS: 100 INJECTION, SOLUTION INTRAVENOUS; SUBCUTANEOUS at 21:53

## 2021-01-09 RX ADMIN — DEXAMETHASONE SODIUM PHOSPHATE 6 MG: 4 INJECTION INTRA-ARTICULAR; INTRALESIONAL; INTRAMUSCULAR; INTRAVENOUS; SOFT TISSUE at 08:29

## 2021-01-09 RX ADMIN — OXYCODONE HYDROCHLORIDE AND ACETAMINOPHEN 1000 MG: 500 TABLET ORAL at 08:29

## 2021-01-09 RX ADMIN — OXYCODONE HYDROCHLORIDE AND ACETAMINOPHEN 1000 MG: 500 TABLET ORAL at 21:52

## 2021-01-09 RX ADMIN — FAMOTIDINE 20 MG: 20 TABLET ORAL at 08:29

## 2021-01-09 RX ADMIN — GUAIFENESIN 600 MG: 600 TABLET ORAL at 08:29

## 2021-01-09 RX ADMIN — BENZONATATE 100 MG: 100 CAPSULE ORAL at 08:28

## 2021-01-09 RX ADMIN — TIMOLOL MALEATE 2 DROP: 2.5 SOLUTION/ DROPS OPHTHALMIC at 17:40

## 2021-01-09 RX ADMIN — BENZONATATE 100 MG: 100 CAPSULE ORAL at 17:39

## 2021-01-09 RX ADMIN — LOSARTAN POTASSIUM 25 MG: 25 TABLET, FILM COATED ORAL at 08:29

## 2021-01-09 RX ADMIN — BENZONATATE 100 MG: 100 CAPSULE ORAL at 21:53

## 2021-01-09 RX ADMIN — GUAIFENESIN AND DEXTROMETHORPHAN 10 ML: 100; 10 SYRUP ORAL at 21:53

## 2021-01-09 RX ADMIN — INSULIN LISPRO 2 UNITS: 100 INJECTION, SOLUTION INTRAVENOUS; SUBCUTANEOUS at 17:38

## 2021-01-09 NOTE — ASSESSMENT & PLAN NOTE
· Reports being diagnosed last week on 12/31  · Reports over the last couple of days having worsening shortness of breath, cough, was hypoxemic in 80s prior to arrival  · Initially he was on 2 L oxygen but later on his condition worsened  Required mid flow   · Evaluated by pulmonary and ICU team   · Continue dexamethasone and remdesivir  · Discontinue IV antibiotics because procalcitonin negative  · Given convalescent plasma January 6th  · He is feeling much better now  Currently he is on 1-2 L oxygen via nasal cannula

## 2021-01-09 NOTE — PROGRESS NOTES
Progress Note - Eufemia Daniels 1965, 48 y o  male MRN: [id number]    Unit/Bed#: -01 Encounter: [id number]    Primary Care Provider: Judd Taylor MD   Date and time admitted to hospital: 1/5/2021  9:35 PM        * Pneumonia due to COVID-19 virus  Assessment & Plan  · Reports being diagnosed last week on 12/31  · Reports over the last couple of days having worsening shortness of breath, cough, was hypoxemic in 80s prior to arrival  · Initially he was on 2 L oxygen but later on his condition worsened  Required mid flow   · Evaluated by pulmonary and ICU team   · Continue dexamethasone and remdesivir  · Discontinue IV antibiotics because procalcitonin negative  · Given convalescent plasma January 6th  · He is feeling much better now  Currently he is on 1-2 L oxygen via nasal cannula  Chest pain  Assessment & Plan  · Admits to chest pain at rest as well as with coughing  · Most likely related to COVID-19 pneumonia and coughing  · Serial troponin negative  ACS ruled out  also D-dimer negative  · PE ruled out    Hyponatremia  Assessment & Plan  · Resolved with IV fluid    Sepsis ([facility] [address] )  Assessment & Plan  · Currently meeting criteria due to being febrile upon arrival, tachycardia which has resolved and tachypnea  · In setting of COVID-19 pneumonia  · Will discontinue systemic antibiotics    Procalcitonin negative, blood culture no growth  · See COVID-19 treatment plan    Type 2 diabetes mellitus without complication [facility])  Assessment & Plan  Lab Results   Component Value Date    HGBA1C 7 0 (H) 01/22/2020       Recent Labs     01/08/21  1533 01/08/21  2117 01/09/21  0615 01/09/21  1111   POCGLU 297* 260* 143* 295*       Blood Sugar Average: Last 72 hrs:  · (P) 225 [id number] blood glucose checks q i d , hypoglycemia protocol  · Will hold home metformin while hospitalized  · Sliding scale insulin      VTE Pharmacologic Prophylaxis:   Pharmacologic: Enoxaparin (Lovenox)  Mechanical VTE Prophylaxis in Place: Yes    Patient Centered Rounds: I have performed bedside rounds with nursing staff today  Discussions with Specialists or Other Care Team Provider:     Education and Discussions with Family / Patient:  Patient    Time Spent for Care: More than 50% of total time spent on counseling and coordination of care as described above  Current Length of Stay: 3 day(s)    Current Patient Status: Inpatient   Certification Statement: The patient will continue to require additional inpatient hospital stay due to See above    Discharge Plan:  48 hours    Code Status: Level 1 - Full Code      Subjective:   I have seen and examined the patient bedside this morning  Patient lying on bed  No new complaints  No chest pain  Shortness of breath improving  Currently on 1-2 L oxygen via nasal cannula  Objective:     Vitals:   Temp (24hrs), Av 3 °F (36 8 °C), Min:98 2 °F (36 8 °C), Max:98 3 °F (36 8 °C)    Temp:  [98 2 °F (36 8 °C)-98 3 °F (36 8 °C)] 98 3 °F (36 8 °C)  HR:  [63-85] 73  Resp:  [17-18] 18  BP: (108-109)/(67) 108/67  SpO2:  [94 %-95 %] 94 %  Body mass index is 26 41 kg/m²  Input and Output Summary (last 24 hours): Intake/Output Summary (Last 24 hours) at 2021 1608  Last data filed at 2021 1754  Gross per 24 hour   Intake 620 ml   Output    Net 620 ml       Physical Exam:     Physical Exam  Limited due to pandemic COVID-19 infection      General- Awake, alert and oriented x 3, looks comfortable  Respiratory system- breathing on 2 L oxygen, not using any accessory muscles  CVS- normal S1, S2  Psych- No acute psychosis  CNS- moving all extremities    Additional Data:     Labs:    Results from last 7 days   Lab Units 21  0525  21  0538   WBC Thousand/uL 6 58   < > 5 05   HEMOGLOBIN g/dL 13 1   < > 13 0   HEMATOCRIT % 38 8   < > 37 1   PLATELETS Thousands/uL 261   < > 172   BANDS PCT %  --   --  5   NEUTROS PCT % 75  --   --    LYMPHS PCT % 15  --   --    LYMPHO PCT %  -- --  10*   MONOS PCT % 8  --   --    MONO PCT %  --   --  5   EOS PCT % 1  --  1    < > = values in this interval not displayed  Results from last 7 days   Lab Units 01/09/21  0525   SODIUM mmol/L 137   POTASSIUM mmol/L 3 7   CHLORIDE mmol/L 103   CO2 mmol/L 25   BUN mg/dL 14   CREATININE mg/dL 0 79   ANION GAP mmol/L 9   CALCIUM mg/dL 8 1*   ALBUMIN g/dL 2 4*   TOTAL BILIRUBIN mg/dL 0 50   ALK PHOS U/L 58   ALT U/L 25   AST U/L 18   GLUCOSE RANDOM mg/dL 160*     Results from last 7 days   Lab Units 01/05/21  2158   INR  1 11     Results from last 7 days   Lab Units 01/09/21  1111 01/09/21  0615 01/08/21  2117 01/08/21  1533 01/08/21  1059 01/08/21  0538 01/07/21  2116 01/07/21  1558 01/07/21  1101 01/07/21  0517 01/06/21  2314 01/06/21  1554   POC GLUCOSE mg/dl 295* 143* 260* 297* 261* 165* 232* 245* 256* 216* 209* 185*         Results from last 7 days   Lab Units 01/07/21  0532 01/05/21  2158   LACTIC ACID mmol/L  --  1 1   PROCALCITONIN ng/ml 0 13 0 14           * I Have Reviewed All Lab Data Listed Above  * Additional Pertinent Lab Tests Reviewed: All Labs Within Last 24 Hours Reviewed    Imaging:    Imaging Reports Reviewed Today Include:  Imaging Personally Reviewed by Myself Includes:      Recent Cultures (last 7 days):     Results from last 7 days   Lab Units 01/05/21  2205 01/05/21  2202   BLOOD CULTURE  No Growth at 72 hrs  No Growth at 72 hrs         Last 24 Hours Medication List:   Current Facility-Administered Medications   Medication Dose Route Frequency Provider Last Rate    acetaminophen  650 mg Oral Q6H PRN Kamar Phenes, PA-C      albuterol  2 puff Inhalation Q4H PRN Jayna [place], PA-C      aluminum-magnesium hydroxide-simethicone  30 mL Oral Q6H PRN Kamar Phenes, PA-C      ascorbic acid  1,000 mg Oral Q12H [address] Kamar Phenes, PA-C      benzonatate  100 mg Oral TID Glaluis miguel Serna MD      cholecalciferol  2,000 Units Oral Daily Kamar Simmons PA-C      dexamethasone  6 mg Intravenous Daily Irma Lockett MD      dextromethorphan-guaiFENesin  10 mL Oral Q4H PRN Irma Lockett MD      enoxaparin  30 mg Subcutaneous Q12H [facility] Shira Meyer PA-C      famotidine  20 mg Oral BID Irma Lockett MD      guaiFENesin  600 mg Oral Q12H [facility] Irma Lockett MD      insulin lispro  1-5 Units Subcutaneous TID AC Shira Meyer PA-C      insulin lispro  1-5 Units Subcutaneous HS Shira Meyer PA-C      losartan  25 mg Oral Daily Shira Meyer PA-C      zinc sulfate  220 mg Oral Daily Shira Meyer PA-C      Followed by   Kraig Pandya ON 1/13/2021] multivitamin-minerals  1 tablet Oral Daily Shira Meyer PA-C      pravastatin  40 mg Oral Daily With Dinner Shira Meyer PA-C      remdesivir  100 mg Intravenous Q24H Shira Meyer PA-C 100 mg (01/09/21 0512)    timolol  2 drop Left Eye BID Shira Meyer PA-C          Today, Patient Was Seen By: Clement Ventura MD    ** Please Note: Dictation voice to text software may have been used in the creation of this document   **

## 2021-01-09 NOTE — ASSESSMENT & PLAN NOTE
Lab Results   Component Value Date    HGBA1C 7 0 (H) 01/22/2020       Recent Labs     01/08/21  1533 01/08/21  2117 01/09/21  0615 01/09/21  1111   POCGLU 297* 260* 143* 295*       Blood Sugar Average: Last 72 hrs:  · (P) 225 [id number] blood glucose checks q i d , hypoglycemia protocol  · Will hold home metformin while hospitalized  · Sliding scale insulin

## 2021-01-10 LAB
ALBUMIN SERPL BCP-MCNC: 2.3 G/DL (ref 3.5–5)
ALP SERPL-CCNC: 56 U/L (ref 46–116)
ALT SERPL W P-5'-P-CCNC: 24 U/L (ref 12–78)
ANION GAP SERPL CALCULATED.3IONS-SCNC: 8 MMOL/L (ref 4–13)
AST SERPL W P-5'-P-CCNC: 15 U/L (ref 5–45)
BASOPHILS # BLD AUTO: 0.01 THOUSANDS/ΜL (ref 0–0.1)
BASOPHILS NFR BLD AUTO: 0 % (ref 0–1)
BILIRUB SERPL-MCNC: 0.4 MG/DL (ref 0.2–1)
BUN SERPL-MCNC: 14 MG/DL (ref 5–25)
CALCIUM ALBUM COR SERPL-MCNC: 9.5 MG/DL (ref 8.3–10.1)
CALCIUM SERPL-MCNC: 8.1 MG/DL (ref 8.3–10.1)
CHLORIDE SERPL-SCNC: 104 MMOL/L (ref 100–108)
CO2 SERPL-SCNC: 26 MMOL/L (ref 21–32)
CREAT SERPL-MCNC: 0.84 MG/DL (ref 0.6–1.3)
CRP SERPL QL: 26.8 MG/L
D DIMER PPP FEU-MCNC: 0.29 UG/ML FEU
EOSINOPHIL # BLD AUTO: 0.06 THOUSAND/ΜL (ref 0–0.61)
EOSINOPHIL NFR BLD AUTO: 1 % (ref 0–6)
ERYTHROCYTE [DISTWIDTH] IN BLOOD BY AUTOMATED COUNT: 13.3 % (ref 11.6–15.1)
GFR SERPL CREATININE-BSD FRML MDRD: 99 ML/MIN/1.73SQ M
GLUCOSE SERPL-MCNC: 178 MG/DL (ref 65–140)
GLUCOSE SERPL-MCNC: 181 MG/DL (ref 65–140)
GLUCOSE SERPL-MCNC: 266 MG/DL (ref 65–140)
GLUCOSE SERPL-MCNC: 324 MG/DL (ref 65–140)
GLUCOSE SERPL-MCNC: 405 MG/DL (ref 65–140)
HCT VFR BLD AUTO: 39.2 % (ref 36.5–49.3)
HGB BLD-MCNC: 12.9 G/DL (ref 12–17)
IMM GRANULOCYTES # BLD AUTO: 0.09 THOUSAND/UL (ref 0–0.2)
IMM GRANULOCYTES NFR BLD AUTO: 2 % (ref 0–2)
LYMPHOCYTES # BLD AUTO: 1.08 THOUSANDS/ΜL (ref 0.6–4.47)
LYMPHOCYTES NFR BLD AUTO: 19 % (ref 14–44)
MCH RBC QN AUTO: 30.3 PG (ref 26.8–34.3)
MCHC RBC AUTO-ENTMCNC: 32.9 G/DL (ref 31.4–37.4)
MCV RBC AUTO: 92 FL (ref 82–98)
MONOCYTES # BLD AUTO: 0.57 THOUSAND/ΜL (ref 0.17–1.22)
MONOCYTES NFR BLD AUTO: 10 % (ref 4–12)
NEUTROPHILS # BLD AUTO: 3.97 THOUSANDS/ΜL (ref 1.85–7.62)
NEUTS SEG NFR BLD AUTO: 68 % (ref 43–75)
NRBC BLD AUTO-RTO: 0 /100 WBCS
PLATELET # BLD AUTO: 264 THOUSANDS/UL (ref 149–390)
PMV BLD AUTO: 9.7 FL (ref 8.9–12.7)
POTASSIUM SERPL-SCNC: 3.9 MMOL/L (ref 3.5–5.3)
PROT SERPL-MCNC: 5.9 G/DL (ref 6.4–8.2)
RBC # BLD AUTO: 4.26 MILLION/UL (ref 3.88–5.62)
SODIUM SERPL-SCNC: 138 MMOL/L (ref 136–145)
WBC # BLD AUTO: 5.78 THOUSAND/UL (ref 4.31–10.16)

## 2021-01-10 PROCEDURE — 82948 REAGENT STRIP/BLOOD GLUCOSE: CPT

## 2021-01-10 PROCEDURE — 99232 SBSQ HOSP IP/OBS MODERATE 35: CPT | Performed by: INTERNAL MEDICINE

## 2021-01-10 PROCEDURE — 85379 FIBRIN DEGRADATION QUANT: CPT | Performed by: INTERNAL MEDICINE

## 2021-01-10 PROCEDURE — 80053 COMPREHEN METABOLIC PANEL: CPT | Performed by: INTERNAL MEDICINE

## 2021-01-10 PROCEDURE — 85025 COMPLETE CBC W/AUTO DIFF WBC: CPT | Performed by: INTERNAL MEDICINE

## 2021-01-10 PROCEDURE — 86140 C-REACTIVE PROTEIN: CPT | Performed by: INTERNAL MEDICINE

## 2021-01-10 RX ADMIN — GUAIFENESIN 600 MG: 600 TABLET ORAL at 21:52

## 2021-01-10 RX ADMIN — INSULIN LISPRO 2 UNITS: 100 INJECTION, SOLUTION INTRAVENOUS; SUBCUTANEOUS at 11:33

## 2021-01-10 RX ADMIN — FAMOTIDINE 20 MG: 20 TABLET ORAL at 17:04

## 2021-01-10 RX ADMIN — BENZONATATE 100 MG: 100 CAPSULE ORAL at 08:11

## 2021-01-10 RX ADMIN — DEXAMETHASONE SODIUM PHOSPHATE 6 MG: 4 INJECTION INTRA-ARTICULAR; INTRALESIONAL; INTRAMUSCULAR; INTRAVENOUS; SOFT TISSUE at 08:13

## 2021-01-10 RX ADMIN — OXYCODONE HYDROCHLORIDE AND ACETAMINOPHEN 1000 MG: 500 TABLET ORAL at 08:11

## 2021-01-10 RX ADMIN — OXYCODONE HYDROCHLORIDE AND ACETAMINOPHEN 1000 MG: 500 TABLET ORAL at 21:51

## 2021-01-10 RX ADMIN — ENOXAPARIN SODIUM 30 MG: 30 INJECTION SUBCUTANEOUS at 08:15

## 2021-01-10 RX ADMIN — BENZONATATE 100 MG: 100 CAPSULE ORAL at 17:04

## 2021-01-10 RX ADMIN — INSULIN LISPRO 1 UNITS: 100 INJECTION, SOLUTION INTRAVENOUS; SUBCUTANEOUS at 08:16

## 2021-01-10 RX ADMIN — PRAVASTATIN SODIUM 40 MG: 40 TABLET ORAL at 17:04

## 2021-01-10 RX ADMIN — ZINC SULFATE 220 MG (50 MG) CAPSULE 220 MG: CAPSULE at 08:11

## 2021-01-10 RX ADMIN — Medication 2000 UNITS: at 08:11

## 2021-01-10 RX ADMIN — TIMOLOL MALEATE 2 DROP: 2.5 SOLUTION/ DROPS OPHTHALMIC at 08:18

## 2021-01-10 RX ADMIN — BENZONATATE 100 MG: 100 CAPSULE ORAL at 21:52

## 2021-01-10 RX ADMIN — TIMOLOL MALEATE 2 DROP: 2.5 SOLUTION/ DROPS OPHTHALMIC at 17:05

## 2021-01-10 RX ADMIN — INSULIN LISPRO 5 UNITS: 100 INJECTION, SOLUTION INTRAVENOUS; SUBCUTANEOUS at 17:03

## 2021-01-10 RX ADMIN — FAMOTIDINE 20 MG: 20 TABLET ORAL at 08:11

## 2021-01-10 RX ADMIN — GUAIFENESIN 600 MG: 600 TABLET ORAL at 08:11

## 2021-01-10 RX ADMIN — INSULIN LISPRO 3 UNITS: 100 INJECTION, SOLUTION INTRAVENOUS; SUBCUTANEOUS at 21:52

## 2021-01-10 RX ADMIN — LOSARTAN POTASSIUM 25 MG: 25 TABLET, FILM COATED ORAL at 08:11

## 2021-01-10 RX ADMIN — GUAIFENESIN AND DEXTROMETHORPHAN 10 ML: 100; 10 SYRUP ORAL at 21:51

## 2021-01-10 RX ADMIN — ENOXAPARIN SODIUM 30 MG: 30 INJECTION SUBCUTANEOUS at 21:52

## 2021-01-10 RX ADMIN — REMDESIVIR 100 MG: 100 INJECTION, POWDER, LYOPHILIZED, FOR SOLUTION INTRAVENOUS at 05:16

## 2021-01-10 NOTE — PLAN OF CARE
Problem: Potential for Falls  Goal: Patient will remain free of falls  Description: INTERVENTIONS:  - Assess patient frequently for physical needs  -  Identify cognitive and physical deficits and behaviors that affect risk of falls    -  [place] fall precautions as indicated by assessment   - Educate patient/family on patient safety including physical limitations  - Instruct patient to call for assistance with activity based on assessment  - Modify environment to reduce risk of injury  - Consider OT/PT consult to assist with strengthening/mobility  Outcome: Progressing     Problem: INFECTION - ADULT  Goal: Absence or prevention of progression during hospitalization  Description: INTERVENTIONS:  - Assess and monitor for signs and symptoms of infection  - Monitor lab/diagnostic results  - Monitor all insertion sites, i e  indwelling lines, tubes, and drains  - Monitor endotracheal if appropriate and nasal secretions for changes in amount and color  - [place] appropriate cooling/warming therapies per order  - Administer medications as ordered  - Instruct and encourage patient and family to use good hand hygiene technique  - Identify and instruct in appropriate isolation precautions for identified infection/condition  Outcome: Progressing  Goal: Absence of fever/infection during neutropenic period  Description: INTERVENTIONS:  - Monitor WBC    Outcome: Progressing     Problem: PAIN - ADULT  Goal: Verbalizes/displays adequate comfort level or baseline comfort level  Description: Interventions:  - Encourage patient to monitor pain and request assistance  - Assess pain using appropriate pain scale  - Administer analgesics based on type and severity of pain and evaluate response  - Implement non-pharmacological measures as appropriate and evaluate response  - Consider cultural and social influences on pain and pain management  - Notify physician/advanced practitioner if interventions unsuccessful or patient reports new pain  Outcome: Progressing     Problem: SAFETY ADULT  Goal: Patient will remain free of falls  Description: INTERVENTIONS:  - Assess patient frequently for physical needs  -  Identify cognitive and physical deficits and behaviors that affect risk of falls    -  [place] fall precautions as indicated by assessment   - Educate patient/family on patient safety including physical limitations  - Instruct patient to call for assistance with activity based on assessment  - Modify environment to reduce risk of injury  - Consider OT/PT consult to assist with strengthening/mobility  Outcome: Progressing  Goal: Maintain or return to baseline ADL function  Description: INTERVENTIONS:  -  Assess patient's ability to carry out ADLs; assess patient's baseline for ADL function and identify physical deficits which impact ability to perform ADLs (bathing, care of mouth/teeth, toileting, grooming, dressing, etc )  - Assess/evaluate cause of self-care deficits   - Assess range of motion  - Assess patient's mobility; develop plan if impaired  - Assess patient's need for assistive devices and provide as appropriate  - Encourage maximum independence but intervene and supervise when necessary  - Involve family in performance of ADLs  - Assess for home care needs following discharge   - Consider OT consult to assist with ADL evaluation and planning for discharge  - Provide patient education as appropriate  Outcome: Progressing  Goal: Maintain or return mobility status to optimal level  Description: INTERVENTIONS:  - Assess patient's baseline mobility status (ambulation, transfers, stairs, etc )    - Identify cognitive and physical deficits and behaviors that affect mobility  - Identify mobility aids required to assist with transfers and/or ambulation (gait belt, sit-to-stand, lift, walker, cane, etc )  - [place] fall precautions as indicated by assessment  - Record patient progress and toleration of activity level on Mobility SBAR; progress patient to next Phase/Stage  - Instruct patient to call for assistance with activity based on assessment  - Consider rehabilitation consult to assist with strengthening/weightbearing, etc   Outcome: Progressing     Problem: RESPIRATORY - ADULT  Goal: Achieves optimal ventilation and oxygenation  Description: INTERVENTIONS:  - Assess for changes in respiratory status  - Assess for changes in mentation and behavior  - Position to facilitate oxygenation and minimize respiratory effort  - Oxygen administered by appropriate delivery if ordered  - Initiate smoking cessation education as indicated  - Encourage broncho-pulmonary hygiene including cough, deep breathe, Incentive Spirometry  - Assess the need for suctioning and aspirate as needed  - Assess and instruct to report SOB or any respiratory difficulty  - Respiratory Therapy support as indicated  Outcome: Progressing

## 2021-01-10 NOTE — ASSESSMENT & PLAN NOTE
· Reports being diagnosed last week on 12/31  · Reports over the last couple of days having worsening shortness of breath, cough, was hypoxemic in 80s prior to arrival  · Initially he was on 2 L oxygen but later on his condition worsened  Required mid flow   · Evaluated by pulmonary and ICU team   · Continue IV dexamethasone  Finished 5 days remdesivir course  · Discontinue IV antibiotics because procalcitonin negative  · Given convalescent plasma January 6th  · He is feeling much better now  Currently he is on 1 L oxygen via nasal cannula

## 2021-01-10 NOTE — PROGRESS NOTES
Progress Note - Marivel Guillaume 1965, 48 y o  male MRN: [id number]    Unit/Bed#: -01 Encounter: [id number]    Primary Care Provider: Milan Lockett MD   Date and time admitted to hospital: 1/5/2021  9:35 PM        * Pneumonia due to COVID-19 virus  Assessment & Plan  · Reports being diagnosed last week on 12/31  · Reports over the last couple of days having worsening shortness of breath, cough, was hypoxemic in 80s prior to arrival  · Initially he was on 2 L oxygen but later on his condition worsened  Required mid flow   · Evaluated by pulmonary and ICU team   · Continue IV dexamethasone  Finished 5 days remdesivir course  · Discontinue IV antibiotics because procalcitonin negative  · Given convalescent plasma January 6th  · He is feeling much better now  Currently he is on 1 L oxygen via nasal cannula  Chest pain  Assessment & Plan  · Admits to chest pain at rest as well as with coughing  · Most likely related to COVID-19 pneumonia and coughing  · Serial troponin negative  ACS ruled out  also D-dimer negative  · PE ruled out    Hyponatremia  Assessment & Plan  · Resolved with IV fluid    Sepsis ([facility] [address] )  Assessment & Plan  · Currently meeting criteria due to being febrile upon arrival, tachycardia which has resolved and tachypnea  · In setting of COVID-19 pneumonia  · Will discontinue systemic antibiotics    Procalcitonin negative, blood culture no growth  · See COVID-19 treatment plan    Type 2 diabetes mellitus without complication [facility])  Assessment & Plan  Lab Results   Component Value Date    HGBA1C 7 0 (H) 01/22/2020       Recent Labs     01/09/21  1111 01/09/21  1620 01/09/21  2149 01/10/21  0629   POCGLU 295* 268* 293* 178*       Blood Sugar Average: Last 72 hrs:  · (P) 240 [id number] blood glucose checks q i d , hypoglycemia protocol  · Will hold home metformin while hospitalized  · Sliding scale insulin        VTE Pharmacologic Prophylaxis:   Pharmacologic: Enoxaparin (Lovenox)  Mechanical VTE Prophylaxis in Place: Yes    Patient Centered Rounds: I have performed bedside rounds with nursing staff today  Discussions with Specialists or Other Care Team Provider:     Education and Discussions with Family / Patient:  Patient    Time Spent for Care: More than 50% of total time spent on counseling and coordination of care as described above  Current Length of Stay: 4 day(s)    Current Patient Status: Inpatient   Certification Statement: The patient will continue to require additional inpatient hospital stay due to See above    Discharge Plan:  24 hours    Code Status: Level 1 - Full Code      Subjective:   I have seen and examined the patient bedside this morning  Patient feels much better  Afebrile now  Currently on 1 L oxygen via nasal cannula  Objective:     Vitals:   Temp (24hrs), Av 9 °F (36 6 °C), Min:97 8 °F (36 6 °C), Max:98 2 °F (36 8 °C)    Temp:  [97 8 °F (36 6 °C)-98 2 °F (36 8 °C)] 98 2 °F (36 8 °C)  HR:  [54-73] 64  Resp:  [18] 18  BP: (112-114)/(64-66) 114/66  SpO2:  [94 %-96 %] 96 %  Body mass index is 26 41 kg/m²  Input and Output Summary (last 24 hours): Intake/Output Summary (Last 24 hours) at 1/10/2021 0808  Last data filed at 2021 1900  Gross per 24 hour   Intake 840 ml   Output    Net 840 ml       Physical Exam:     Physical Exam  Limited due to pandemic COVID-19 infection      General- Awake, alert and oriented x 3, looks comfortable  Respiratory system- breathing on 1 L oxygen, not using any accessory muscles  CVS-normal S1, S2  Psych- No acute psychosis  CNS- moving all extremities    Additional Data:     Labs:    Results from last 7 days   Lab Units 01/10/21  0626  21  0538   WBC Thousand/uL 5 78   < > 5 05   HEMOGLOBIN g/dL 12 9   < > 13 0   HEMATOCRIT % 39 2   < > 37 1   PLATELETS Thousands/uL 264   < > 172   BANDS PCT %  --   --  5   NEUTROS PCT % 68   < >  --    LYMPHS PCT % 19   < >  --    LYMPHO PCT %  --   --  10* MONOS PCT % 10   < >  --    MONO PCT %  --   --  5   EOS PCT % 1   < > 1    < > = values in this interval not displayed  Results from last 7 days   Lab Units 01/10/21  0626   SODIUM mmol/L 138   POTASSIUM mmol/L 3 9   CHLORIDE mmol/L 104   CO2 mmol/L 26   BUN mg/dL 14   CREATININE mg/dL 0 84   ANION GAP mmol/L 8   CALCIUM mg/dL 8 1*   ALBUMIN g/dL 2 3*   TOTAL BILIRUBIN mg/dL 0 40   ALK PHOS U/L 56   ALT U/L 24   AST U/L 15   GLUCOSE RANDOM mg/dL 181*     Results from last 7 days   Lab Units 01/05/21  2158   INR  1 11     Results from last 7 days   Lab Units 01/10/21  0629 01/09/21  2149 01/09/21  1620 01/09/21  1111 01/09/21  0615 01/08/21  2117 01/08/21  1533 01/08/21  1059 01/08/21  0538 01/07/21  2116 01/07/21  1558 01/07/21  1101   POC GLUCOSE mg/dl 178* 293* 268* 295* 143* 260* 297* 261* 165* 232* 245* 256*         Results from last 7 days   Lab Units 01/07/21  0532 01/05/21  2158   LACTIC ACID mmol/L  --  1 1   PROCALCITONIN ng/ml 0 13 0 14           * I Have Reviewed All Lab Data Listed Above  * Additional Pertinent Lab Tests Reviewed: All Labs Within Last 24 Hours Reviewed    Imaging:    Imaging Reports Reviewed Today Include:   Imaging Personally Reviewed by Myself Includes:      Recent Cultures (last 7 days):     Results from last 7 days   Lab Units 01/05/21  2205 01/05/21  2202   BLOOD CULTURE  No Growth at 72 hrs  No Growth at 72 hrs         Last 24 Hours Medication List:   Current Facility-Administered Medications   Medication Dose Route Frequency Provider Last Rate    acetaminophen  650 mg Oral Q6H PRN Shira Meyer PA-C      albuterol  2 puff Inhalation Q4H PRN ELIZABETH Tamayo      aluminum-magnesium hydroxide-simethicone  30 mL Oral Q6H PRN Shira Meyer PA-C      ascorbic acid  1,000 mg Oral Q12H [facility] Shira Meyer PA-C      benzonatate  100 mg Oral TID Irma Lockett MD      cholecalciferol  2,000 Units Oral Daily Shira Meyer PA-C      dexamethasone  6 mg Intravenous Daily Luis Watts MD      dextromethorphan-guaiFENesin  10 mL Oral Q4H PRN Luis Watts MD      enoxaparin  30 mg Subcutaneous Q12H [facility] Nany Tong PA-C      famotidine  20 mg Oral BID Luis Watts MD      guaiFENesin  600 mg Oral Q12H [facility] Luis Watts MD      insulin lispro  1-5 Units Subcutaneous TID AC Nany Tong PA-C      insulin lispro  1-5 Units Subcutaneous HS Nany Tong PA-C      losartan  25 mg Oral Daily Trumannda ELIZABETH Tong      zinc sulfate  220 mg Oral Daily Nany Tong PA-C      Followed by   Wilder Lockhart ON 1/13/2021] multivitamin-minerals  1 tablet Oral Daily Nany Tong PA-C      pravastatin  40 mg Oral Daily With Dinner Nany Tong PA-C      timolol  2 drop Left Eye BID Nany Tong PA-C          Today, Patient Was Seen By: Mendoza Rich MD    ** Please Note: Dictation voice to text software may have been used in the creation of this document   **

## 2021-01-10 NOTE — ASSESSMENT & PLAN NOTE
Lab Results   Component Value Date    HGBA1C 7 0 (H) 01/22/2020       Recent Labs     01/09/21  1111 01/09/21  1620 01/09/21  2149 01/10/21  0629   POCGLU 295* 268* 293* 178*       Blood Sugar Average: Last 72 hrs:  · (P) 161 [id number] blood glucose checks q i d , hypoglycemia protocol  · Will hold home metformin while hospitalized  · Sliding scale insulin

## 2021-01-11 VITALS
HEIGHT: 68 IN | HEART RATE: 61 BPM | WEIGHT: 173.72 LBS | RESPIRATION RATE: 18 BRPM | OXYGEN SATURATION: 95 % | SYSTOLIC BLOOD PRESSURE: 111 MMHG | DIASTOLIC BLOOD PRESSURE: 66 MMHG | BODY MASS INDEX: 26.33 KG/M2 | TEMPERATURE: 98.1 F

## 2021-01-11 PROBLEM — E87.1 HYPONATREMIA: Status: RESOLVED | Noted: 2021-01-06 | Resolved: 2021-01-11

## 2021-01-11 PROBLEM — R07.9 CHEST PAIN: Status: RESOLVED | Noted: 2021-01-06 | Resolved: 2021-01-11

## 2021-01-11 PROBLEM — A41.9 SEPSIS (HCC): Status: RESOLVED | Noted: 2021-01-06 | Resolved: 2021-01-11

## 2021-01-11 LAB
ALBUMIN SERPL BCP-MCNC: 2.5 G/DL (ref 3.5–5)
ALP SERPL-CCNC: 63 U/L (ref 46–116)
ALT SERPL W P-5'-P-CCNC: 31 U/L (ref 12–78)
ANION GAP SERPL CALCULATED.3IONS-SCNC: 5 MMOL/L (ref 4–13)
AST SERPL W P-5'-P-CCNC: 19 U/L (ref 5–45)
BACTERIA BLD CULT: NORMAL
BACTERIA BLD CULT: NORMAL
BASOPHILS # BLD AUTO: 0.01 THOUSANDS/ΜL (ref 0–0.1)
BASOPHILS NFR BLD AUTO: 0 % (ref 0–1)
BILIRUB SERPL-MCNC: 0.5 MG/DL (ref 0.2–1)
BUN SERPL-MCNC: 15 MG/DL (ref 5–25)
CALCIUM ALBUM COR SERPL-MCNC: 9.7 MG/DL (ref 8.3–10.1)
CALCIUM SERPL-MCNC: 8.5 MG/DL (ref 8.3–10.1)
CHLORIDE SERPL-SCNC: 103 MMOL/L (ref 100–108)
CO2 SERPL-SCNC: 30 MMOL/L (ref 21–32)
CREAT SERPL-MCNC: 0.83 MG/DL (ref 0.6–1.3)
CRP SERPL QL: 12.1 MG/L
EOSINOPHIL # BLD AUTO: 0.09 THOUSAND/ΜL (ref 0–0.61)
EOSINOPHIL NFR BLD AUTO: 1 % (ref 0–6)
ERYTHROCYTE [DISTWIDTH] IN BLOOD BY AUTOMATED COUNT: 13.2 % (ref 11.6–15.1)
GFR SERPL CREATININE-BSD FRML MDRD: 100 ML/MIN/1.73SQ M
GLUCOSE SERPL-MCNC: 188 MG/DL (ref 65–140)
GLUCOSE SERPL-MCNC: 209 MG/DL (ref 65–140)
GLUCOSE SERPL-MCNC: 319 MG/DL (ref 65–140)
HCT VFR BLD AUTO: 41 % (ref 36.5–49.3)
HGB BLD-MCNC: 13.4 G/DL (ref 12–17)
IMM GRANULOCYTES # BLD AUTO: 0.16 THOUSAND/UL (ref 0–0.2)
IMM GRANULOCYTES NFR BLD AUTO: 2 % (ref 0–2)
LYMPHOCYTES # BLD AUTO: 1.27 THOUSANDS/ΜL (ref 0.6–4.47)
LYMPHOCYTES NFR BLD AUTO: 17 % (ref 14–44)
MCH RBC QN AUTO: 30 PG (ref 26.8–34.3)
MCHC RBC AUTO-ENTMCNC: 32.7 G/DL (ref 31.4–37.4)
MCV RBC AUTO: 92 FL (ref 82–98)
MONOCYTES # BLD AUTO: 0.57 THOUSAND/ΜL (ref 0.17–1.22)
MONOCYTES NFR BLD AUTO: 8 % (ref 4–12)
NEUTROPHILS # BLD AUTO: 5.28 THOUSANDS/ΜL (ref 1.85–7.62)
NEUTS SEG NFR BLD AUTO: 72 % (ref 43–75)
NRBC BLD AUTO-RTO: 0 /100 WBCS
PLATELET # BLD AUTO: 328 THOUSANDS/UL (ref 149–390)
PMV BLD AUTO: 10.1 FL (ref 8.9–12.7)
POTASSIUM SERPL-SCNC: 4.4 MMOL/L (ref 3.5–5.3)
PROT SERPL-MCNC: 6.2 G/DL (ref 6.4–8.2)
RBC # BLD AUTO: 4.47 MILLION/UL (ref 3.88–5.62)
SODIUM SERPL-SCNC: 138 MMOL/L (ref 136–145)
WBC # BLD AUTO: 7.38 THOUSAND/UL (ref 4.31–10.16)

## 2021-01-11 PROCEDURE — 82948 REAGENT STRIP/BLOOD GLUCOSE: CPT

## 2021-01-11 PROCEDURE — 86140 C-REACTIVE PROTEIN: CPT | Performed by: INTERNAL MEDICINE

## 2021-01-11 PROCEDURE — 80053 COMPREHEN METABOLIC PANEL: CPT | Performed by: INTERNAL MEDICINE

## 2021-01-11 PROCEDURE — 99232 SBSQ HOSP IP/OBS MODERATE 35: CPT | Performed by: PHYSICIAN ASSISTANT

## 2021-01-11 PROCEDURE — 94761 N-INVAS EAR/PLS OXIMETRY MLT: CPT

## 2021-01-11 PROCEDURE — 99239 HOSP IP/OBS DSCHRG MGMT >30: CPT | Performed by: INTERNAL MEDICINE

## 2021-01-11 PROCEDURE — 85025 COMPLETE CBC W/AUTO DIFF WBC: CPT | Performed by: INTERNAL MEDICINE

## 2021-01-11 RX ORDER — MULTIVITAMIN/IRON/FOLIC ACID 18MG-0.4MG
1 TABLET ORAL DAILY
Qty: 30 TABLET | Refills: 0 | Status: SHIPPED | OUTPATIENT
Start: 2021-01-13 | End: 2021-12-06

## 2021-01-11 RX ORDER — GUAIFENESIN/DEXTROMETHORPHAN 100-10MG/5
10 SYRUP ORAL EVERY 4 HOURS PRN
Qty: 118 ML | Refills: 0 | Status: SHIPPED | OUTPATIENT
Start: 2021-01-11 | End: 2021-01-21

## 2021-01-11 RX ORDER — FAMOTIDINE 20 MG/1
20 TABLET, FILM COATED ORAL DAILY
Qty: 10 TABLET | Refills: 0 | Status: SHIPPED | OUTPATIENT
Start: 2021-01-11 | End: 2021-12-06

## 2021-01-11 RX ORDER — ACETAMINOPHEN 325 MG/1
650 TABLET ORAL EVERY 6 HOURS PRN
Refills: 0
Start: 2021-01-11 | End: 2021-01-16

## 2021-01-11 RX ORDER — PREDNISONE 20 MG/1
40 TABLET ORAL DAILY
Qty: 8 TABLET | Refills: 0 | Status: SHIPPED | OUTPATIENT
Start: 2021-01-11 | End: 2021-01-15

## 2021-01-11 RX ADMIN — DEXAMETHASONE SODIUM PHOSPHATE 6 MG: 4 INJECTION INTRA-ARTICULAR; INTRALESIONAL; INTRAMUSCULAR; INTRAVENOUS; SOFT TISSUE at 08:48

## 2021-01-11 RX ADMIN — LOSARTAN POTASSIUM 25 MG: 25 TABLET, FILM COATED ORAL at 08:47

## 2021-01-11 RX ADMIN — ZINC SULFATE 220 MG (50 MG) CAPSULE 220 MG: CAPSULE at 08:48

## 2021-01-11 RX ADMIN — TIMOLOL MALEATE 2 DROP: 2.5 SOLUTION/ DROPS OPHTHALMIC at 08:49

## 2021-01-11 RX ADMIN — OXYCODONE HYDROCHLORIDE AND ACETAMINOPHEN 1000 MG: 500 TABLET ORAL at 08:48

## 2021-01-11 RX ADMIN — INSULIN LISPRO 1 UNITS: 100 INJECTION, SOLUTION INTRAVENOUS; SUBCUTANEOUS at 08:49

## 2021-01-11 RX ADMIN — BENZONATATE 100 MG: 100 CAPSULE ORAL at 08:48

## 2021-01-11 RX ADMIN — INSULIN LISPRO 3 UNITS: 100 INJECTION, SOLUTION INTRAVENOUS; SUBCUTANEOUS at 12:32

## 2021-01-11 RX ADMIN — GUAIFENESIN 600 MG: 600 TABLET ORAL at 08:48

## 2021-01-11 RX ADMIN — ENOXAPARIN SODIUM 30 MG: 30 INJECTION SUBCUTANEOUS at 08:49

## 2021-01-11 RX ADMIN — FAMOTIDINE 20 MG: 20 TABLET ORAL at 08:48

## 2021-01-11 RX ADMIN — Medication 2000 UNITS: at 08:48

## 2021-01-11 NOTE — PLAN OF CARE
Problem: Potential for Falls  Goal: Patient will remain free of falls  Description: INTERVENTIONS:  - Assess patient frequently for physical needs  -  Identify cognitive and physical deficits and behaviors that affect risk of falls    -  [place] fall precautions as indicated by assessment   - Educate patient/family on patient safety including physical limitations  - Instruct patient to call for assistance with activity based on assessment  - Modify environment to reduce risk of injury  - Consider OT/PT consult to assist with strengthening/mobility  Outcome: Adequate for Discharge     Problem: PAIN - ADULT  Goal: Verbalizes/displays adequate comfort level or baseline comfort level  Description: Interventions:  - Encourage patient to monitor pain and request assistance  - Assess pain using appropriate pain scale  - Administer analgesics based on type and severity of pain and evaluate response  - Implement non-pharmacological measures as appropriate and evaluate response  - Consider cultural and social influences on pain and pain management  - Notify physician/advanced practitioner if interventions unsuccessful or patient reports new pain  Outcome: Adequate for Discharge     Problem: INFECTION - ADULT  Goal: Absence or prevention of progression during hospitalization  Description: INTERVENTIONS:  - Assess and monitor for signs and symptoms of infection  - Monitor lab/diagnostic results  - Monitor all insertion sites, i e  indwelling lines, tubes, and drains  - Monitor endotracheal if appropriate and nasal secretions for changes in amount and color  - [place] appropriate cooling/warming therapies per order  - Administer medications as ordered  - Instruct and encourage patient and family to use good hand hygiene technique  - Identify and instruct in appropriate isolation precautions for identified infection/condition  Outcome: Adequate for Discharge  Goal: Absence of fever/infection during neutropenic period  Description: INTERVENTIONS:  - Monitor WBC    Outcome: Adequate for Discharge     Problem: SAFETY ADULT  Goal: Patient will remain free of falls  Description: INTERVENTIONS:  - Assess patient frequently for physical needs  -  Identify cognitive and physical deficits and behaviors that affect risk of falls    -  [place] fall precautions as indicated by assessment   - Educate patient/family on patient safety including physical limitations  - Instruct patient to call for assistance with activity based on assessment  - Modify environment to reduce risk of injury  - Consider OT/PT consult to assist with strengthening/mobility  Outcome: Adequate for Discharge  Goal: Maintain or return to baseline ADL function  Description: INTERVENTIONS:  -  Assess patient's ability to carry out ADLs; assess patient's baseline for ADL function and identify physical deficits which impact ability to perform ADLs (bathing, care of mouth/teeth, toileting, grooming, dressing, etc )  - Assess/evaluate cause of self-care deficits   - Assess range of motion  - Assess patient's mobility; develop plan if impaired  - Assess patient's need for assistive devices and provide as appropriate  - Encourage maximum independence but intervene and supervise when necessary  - Involve family in performance of ADLs  - Assess for home care needs following discharge   - Consider OT consult to assist with ADL evaluation and planning for discharge  - Provide patient education as appropriate  Outcome: Adequate for Discharge  Goal: Maintain or return mobility status to optimal level  Description: INTERVENTIONS:  - Assess patient's baseline mobility status (ambulation, transfers, stairs, etc )    - Identify cognitive and physical deficits and behaviors that affect mobility  - Identify mobility aids required to assist with transfers and/or ambulation (gait belt, sit-to-stand, lift, walker, cane, etc )  - [place] fall precautions as indicated by assessment  - Record patient progress and toleration of activity level on Mobility SBAR; progress patient to next Phase/Stage  - Instruct patient to call for assistance with activity based on assessment  - Consider rehabilitation consult to assist with strengthening/weightbearing, etc   Outcome: Adequate for Discharge     Problem: DISCHARGE PLANNING  Goal: Discharge to home or other facility with appropriate resources  Description: INTERVENTIONS:  - Identify barriers to discharge w/patient and caregiver  - Arrange for needed discharge resources and transportation as appropriate  - Identify discharge learning needs (meds, wound care, etc )  - Arrange for interpretive services to assist at discharge as needed  - Refer to Case Management Department for coordinating discharge planning if the patient needs post-hospital services based on physician/advanced practitioner order or complex needs related to functional status, cognitive ability, or social support system  Outcome: Adequate for Discharge     Problem: Knowledge Deficit  Goal: Patient/family/caregiver demonstrates understanding of disease process, treatment plan, medications, and discharge instructions  Description: Complete learning assessment and assess knowledge base    Interventions:  - Provide teaching at level of understanding  - Provide teaching via preferred learning methods  Outcome: Adequate for Discharge     Problem: CARDIOVASCULAR - ADULT  Goal: Maintains optimal cardiac output and hemodynamic stability  Description: INTERVENTIONS:  - Monitor I/O, vital signs and rhythm  - Monitor for S/S and trends of decreased cardiac output  - Administer and titrate ordered vasoactive medications to optimize hemodynamic stability  - Assess quality of pulses, skin color and temperature  - Assess for signs of decreased coronary artery perfusion  - Instruct patient to report change in severity of symptoms  Outcome: Adequate for Discharge  Goal: Absence of cardiac dysrhythmias or at baseline rhythm  Description: INTERVENTIONS:  - Continuous cardiac monitoring, vital signs, obtain 12 lead EKG if ordered  - Administer antiarrhythmic and heart rate control medications as ordered  - Monitor electrolytes and administer replacement therapy as ordered  Outcome: Adequate for Discharge     Problem: RESPIRATORY - ADULT  Goal: Achieves optimal ventilation and oxygenation  Description: INTERVENTIONS:  - Assess for changes in respiratory status  - Assess for changes in mentation and behavior  - Position to facilitate oxygenation and minimize respiratory effort  - Oxygen administered by appropriate delivery if ordered  - Initiate smoking cessation education as indicated  - Encourage broncho-pulmonary hygiene including cough, deep breathe, Incentive Spirometry  - Assess the need for suctioning and aspirate as needed  - Assess and instruct to report SOB or any respiratory difficulty  - Respiratory Therapy support as indicated  Outcome: Adequate for Discharge

## 2021-01-11 NOTE — PLAN OF CARE
Problem: Potential for Falls  Goal: Patient will remain free of falls  Description: INTERVENTIONS:  - Assess patient frequently for physical needs  -  Identify cognitive and physical deficits and behaviors that affect risk of falls    -  [place] fall precautions as indicated by assessment   - Educate patient/family on patient safety including physical limitations  - Instruct patient to call for assistance with activity based on assessment  - Modify environment to reduce risk of injury  - Consider OT/PT consult to assist with strengthening/mobility  Outcome: Progressing     Problem: PAIN - ADULT  Goal: Verbalizes/displays adequate comfort level or baseline comfort level  Description: Interventions:  - Encourage patient to monitor pain and request assistance  - Assess pain using appropriate pain scale  - Administer analgesics based on type and severity of pain and evaluate response  - Implement non-pharmacological measures as appropriate and evaluate response  - Consider cultural and social influences on pain and pain management  - Notify physician/advanced practitioner if interventions unsuccessful or patient reports new pain  Outcome: Progressing     Problem: INFECTION - ADULT  Goal: Absence or prevention of progression during hospitalization  Description: INTERVENTIONS:  - Assess and monitor for signs and symptoms of infection  - Monitor lab/diagnostic results  - Monitor all insertion sites, i e  indwelling lines, tubes, and drains  - Monitor endotracheal if appropriate and nasal secretions for changes in amount and color  - [place] appropriate cooling/warming therapies per order  - Administer medications as ordered  - Instruct and encourage patient and family to use good hand hygiene technique  - Identify and instruct in appropriate isolation precautions for identified infection/condition  Outcome: Progressing  Goal: Absence of fever/infection during neutropenic period  Description: INTERVENTIONS:  - Monitor WBC    Outcome: Progressing     Problem: SAFETY ADULT  Goal: Patient will remain free of falls  Description: INTERVENTIONS:  - Assess patient frequently for physical needs  -  Identify cognitive and physical deficits and behaviors that affect risk of falls    -  [place] fall precautions as indicated by assessment   - Educate patient/family on patient safety including physical limitations  - Instruct patient to call for assistance with activity based on assessment  - Modify environment to reduce risk of injury  - Consider OT/PT consult to assist with strengthening/mobility  Outcome: Progressing  Goal: Maintain or return to baseline ADL function  Description: INTERVENTIONS:  -  Assess patient's ability to carry out ADLs; assess patient's baseline for ADL function and identify physical deficits which impact ability to perform ADLs (bathing, care of mouth/teeth, toileting, grooming, dressing, etc )  - Assess/evaluate cause of self-care deficits   - Assess range of motion  - Assess patient's mobility; develop plan if impaired  - Assess patient's need for assistive devices and provide as appropriate  - Encourage maximum independence but intervene and supervise when necessary  - Involve family in performance of ADLs  - Assess for home care needs following discharge   - Consider OT consult to assist with ADL evaluation and planning for discharge  - Provide patient education as appropriate  Outcome: Progressing  Goal: Maintain or return mobility status to optimal level  Description: INTERVENTIONS:  - Assess patient's baseline mobility status (ambulation, transfers, stairs, etc )    - Identify cognitive and physical deficits and behaviors that affect mobility  - Identify mobility aids required to assist with transfers and/or ambulation (gait belt, sit-to-stand, lift, walker, cane, etc )  - [place] fall precautions as indicated by assessment  - Record patient progress and toleration of activity level on Mobility SBAR; progress patient to next Phase/Stage  - Instruct patient to call for assistance with activity based on assessment  - Consider rehabilitation consult to assist with strengthening/weightbearing, etc   Outcome: Progressing

## 2021-01-11 NOTE — DISCHARGE INSTR - AVS FIRST PAGE
Follow-up with PCP in 1 week  Follow-up with Pulmonary if needed  Come back to the emergency room if condition worsen or recur

## 2021-01-11 NOTE — PROGRESS NOTES
Progress Note - Pulmonary   Duey Neighbor 48 y o  male MRN: [id number]  Unit/Bed#: -Juice Encounter: [id number]    Assessment:  1  Acute hypoxemic respiratory failure, resolved  2  COVID-19 infection  3  Leukopenia, resolved    Plan:  Patient initially required 10 L, has now been titrated off O2 and saturating mid 90s on room air  Will order home O2 eval prior to discharge  He has received remdesivir as well as convalescent plasma  D-dimer has been normal, CRP is trending down  Monitored off antibiotics given negative procalcitonin x2  He is stable for discharge home today from a pulmonary standpoint  Subjective:   Patient resting in bed  Feels well and would like to be discharged home  Objective:     Vitals: Blood pressure 111/66, pulse 61, temperature 98 1 °F (36 7 °C), temperature source Oral, resp  rate 18, height 5' 8" (1 727 m), weight 78 8 kg (173 lb 11 6 oz), SpO2 95 %  ,Body mass index is 26 41 kg/m²  Intake/Output Summary (Last 24 hours) at 1/11/2021 1111  Last data filed at 1/10/2021 2050  Gross per 24 hour   Intake 620 ml   Output    Net 620 ml       Invasive Devices     Peripheral Intravenous Line            Peripheral IV 01/09/21 Right Forearm 2 days                Physical Exam: /66 (BP Location: Right arm)   Pulse 61   Temp 98 1 °F (36 7 °C) (Oral)   Resp 18   Ht 5' 8" (1 727 m)   Wt 78 8 kg (173 lb 11 6 oz)   SpO2 95%   BMI 26 41 kg/m²   General appearance: alert and oriented, in no acute distress  Eyes: PERRL  Lungs: clear to auscultation bilaterally  Heart: regular rate and rhythm and S1, S2 normal  Abdomen: normal findings: soft, non-tender  Extremities: No edema  Skin: Warm and dry  Neurologic: Mental status: Alert, oriented, thought content appropriate     Labs: I have personally reviewed pertinent lab results  , CBC:   Lab Results   Component Value Date    WBC 7 38 01/11/2021    HGB 13 4 01/11/2021    HCT 41 0 01/11/2021    MCV 92 01/11/2021     01/11/2021    MCH 30 0 01/11/2021    MCHC 32 7 01/11/2021    RDW 13 2 01/11/2021    MPV 10 1 01/11/2021    NRBC 0 01/11/2021   , CMP:   Lab Results   Component Value Date    SODIUM 138 01/11/2021    K 4 4 01/11/2021     01/11/2021    CO2 30 01/11/2021    BUN 15 01/11/2021    CREATININE 0 83 01/11/2021    CALCIUM 8 5 01/11/2021    AST 19 01/11/2021    ALT 31 01/11/2021    ALKPHOS 63 01/11/2021    EGFR 100 01/11/2021     Imaging and other studies: I have personally reviewed pertinent reports     and I have personally reviewed pertinent films in PACS

## 2021-01-11 NOTE — RESPIRATORY THERAPY NOTE
Home Oxygen Qualifying Test       Patient name: Nikos Rush        : 1965   Date of Test:  2021  Diagnosis: pneumonia     Home Oxygen Test:    **Medicare Guidelines require item(s) 1-5 on all ambulatory patients or 1 and 2 on non-ambulatory patients  1   Baseline SPO2 on Room Air at rest 96 %  2   SPO2 during exercise on Room Air 95 %  During exercise monitor SpO2  If SPO2 increases >=89% with ambulation do not add supplemental             oxygen  If <= 88% on room air add O2 via NC and titrate patient  Patient must be ambulated with O2 and titrated to > 88% with exe    5  Exercise performed:          walking, duration 6 (min)          []  Supplemental Home Oxygen is indicated  [x]  Client does not qualify for home oxygen        Respiratory Additional Notes- patient does not feel short of breath  Jones Gallardo

## 2021-01-11 NOTE — DISCHARGE INSTRUCTIONS

## 2021-01-13 NOTE — ASSESSMENT & PLAN NOTE
· Reports being diagnosed last week on 12/31  · Reports over the last couple of days having worsening shortness of breath, cough, was hypoxemic in 80s prior to arrival  · Initially he was on 2 L oxygen but later on his condition worsened  Required mid flow   · Evaluated by pulmonary and ICU team   · Continue IV dexamethasone  Finished 5 days remdesivir course  · Discontinue IV antibiotics because procalcitonin negative  · Given convalescent plasma January 6th  · He is feeling fine now  On room air for last 24 hours  · No need for home o2 per home o2 protocol

## 2021-01-13 NOTE — ASSESSMENT & PLAN NOTE
Lab Results   Component Value Date    HGBA1C 7 0 (H) 01/22/2020       Recent Labs     01/10/21  1602 01/10/21  2049 01/11/21  0510 01/11/21  1109   POCGLU 405* 324* 209* 319*       Blood Sugar Average: Last 72 hrs:  · (P) 283 5   · Resume home medications

## 2021-01-13 NOTE — DISCHARGE SUMMARY
Discharge- Devon Melendez 1965, 48 y o  male MRN: [id number]    Unit/Bed#: -01 Encounter: [id number]    Primary Care Provider: Rosie Cheek MD   Date and time admitted to hospital: 1/5/2021  9:35 PM        * Pneumonia due to COVID-19 virus  Assessment & Plan  · Reports being diagnosed last week on 12/31  · Reports over the last couple of days having worsening shortness of breath, cough, was hypoxemic in 80s prior to arrival  · Initially he was on 2 L oxygen but later on his condition worsened  Required mid flow   · Evaluated by pulmonary and ICU team   · Continue IV dexamethasone  Finished 5 days remdesivir course  · Discontinue IV antibiotics because procalcitonin negative  · Given convalescent plasma January 6th  · He is feeling fine now  On room air for last 24 hours  · No need for home o2 per home o2 protocol       Type 2 diabetes mellitus without complication [facility])  Assessment & Plan  Lab Results   Component Value Date    HGBA1C 7 0 (H) 01/22/2020       Recent Labs     01/10/21  1602 01/10/21  2049 01/11/21  0510 01/11/21  1109   POCGLU 405* 324* 209* 319*       Blood Sugar Average: Last 72 hrs:  · (P) 283 5   · Resume home medications        Discharging Physician / Practitioner: Molly Hebert MD  PCP: Rosie Cheek MD  Admission Date:   Admission Orders (From admission, onward)     Ordered        01/06/21 1203  Inpatient Admission  Once         01/05/21 2302  Place in Observation  Once                   Discharge Date: 01/11/21    Resolved Problems  Date Reviewed: 1/6/2021          Resolved    Sepsis ([facility] [address] ) 1/11/2021     Resolved by  Molly Hebert MD    Hyponatremia 1/11/2021     Resolved by  Molly Hebert MD    Chest pain 1/11/2021     Resolved by  Molly Hebert MD          Consultations During Hospital Stay:  · pulmonary    Procedures Performed:   · Convalescent plasma transfusion    Significant Findings / Test Results:   ·     Incidental Findings:   ·      Test Results Pending at Discharge (will require follow up):   ·      Outpatient Tests Requested:  ·     Complications:      Reason for Admission: covid    Hospital Course:     Ann Marie Ames is a 48 y o  male patient who originally presented to the hospital on 1/5/2021 due to covid  Initially he was on 2 l but he needed upto 10L midflow at one point  Evaluated by pulmonary  Finished remdisivir  Was on iv decadron  Improved now  No other bacterial infection  Now on room air and no need for home o2  Stable for discharge  Chest pain from coughing  Serial troponin neg  ACS ruled out  D dimer negative whole time  Please see above list of diagnoses and related plan for additional information  Condition at Discharge: good     Discharge Day Visit / Exam:     Subjective:    Vitals: Blood Pressure: 111/66 (01/11/21 0500)  Pulse: 61 (01/11/21 0500)  Temperature: 98 1 °F (36 7 °C) (01/11/21 0500)  Temp Source: Oral (01/11/21 0500)  Respirations: 18 (01/11/21 0500)  Height: 5' 8" (172 7 cm) (01/05/21 2143)  Weight - Scale: 78 8 kg (173 lb 11 6 oz) (01/05/21 2143)  SpO2: 95 % (01/11/21 1300)  Exam:   Physical Exam  Limited due to pandemic COVID-19 infection  General- Awake, alert and oriented x 3, looks comfortable  Respiratory system- breathing on room air, not using any accessory muscles  Psych- No acute psychosis  CNS- moving all extremities  Discussion with Family:     Discharge instructions/Information to patient and family:   See after visit summary for information provided to patient and family  Provisions for Follow-Up Care:  See after visit summary for information related to follow-up care and any pertinent home health orders  Disposition:     Home    For Discharges to [facility] SNF:   · Not Applicable to this Patient - Not Applicable to this Patient    Planned Readmission:      Discharge Statement:  I spent 32 minutes discharging the patient  This time was spent on the day of discharge   I had direct contact with the patient on the day of discharge  Greater than 50% of the total time was spent examining patient, answering all patient questions, arranging and discussing plan of care with patient as well as directly providing post-discharge instructions  Additional time then spent on discharge activities  Discharge Medications:  See after visit summary for reconciled discharge medications provided to patient and family        ** Please Note: This note has been constructed using a voice recognition system **

## 2021-01-27 NOTE — ED PROVIDER NOTES
History  Chief Complaint   Patient presents with    Shortness of Breath     The patient reports testing positive for COVID on 12/31  Today he reports increased shortness of breath  SpO2 85% per EMS on room air, 97% on 6L  49yo male with a history of type 2 diabetes and hyperlipidemia presenting via EMS for evaluation of shortness of breath over the past 2 days  Patient tested positive for COVID-19 on 12/31/20 but was not having symptoms at that time  Symptoms developed about 4 days ago  Along with his dyspnea, patient also complains of substernal chest pain  Chest pain is worse with coughing  Patient has a home pulse oximeter and notes that his oxygen has been running around 83% today and at one point was as low as 80%  Patient called EMS due to the severity of symptoms and EMS found patient to be hypoxic to 85% on room air  Patient denies any previous history of lung disease  No tobacco use  History provided by:  Patient and EMS personnel   used: No    Shortness of Breath  Severity:  Moderate  Onset quality:  Gradual  Timing:  Constant  Progression:  Worsening  Chronicity:  New  Relieved by:  Nothing  Worsened by:  Exertion  Associated symptoms: chest pain, cough and fever    Associated symptoms: no abdominal pain, no diaphoresis, no ear pain, no headaches, no neck pain, no rash, no syncope, no swollen glands, no vomiting and no wheezing        Prior to Admission Medications   Prescriptions Last Dose Informant Patient Reported?  Taking?   losartan (COZAAR) 25 mg tablet   Yes No   Sig: Take 25 mg by mouth daily   metFORMIN (GLUCOPHAGE) 1000 MG tablet   Yes No   Sig: Take 1,000 mg by mouth 2 (two) times a day with meals   simvastatin (ZOCOR) 20 mg tablet   Yes No   Sig: Take 20 mg by mouth daily at bedtime   timolol (BETIMOL) 0 25 % ophthalmic solution   Yes No   Sig: Administer 2 drops into the left eye 2 (two) times a day      Facility-Administered Medications: None       Past Medical <<----- Click to add NO significant Past Surgical History History:   Diagnosis Date    Diabetes mellitus ([facility] [address] )     type 2, diagnosed in 2013       Past Surgical History:   Procedure Laterality Date    EYE SURGERY      R eye,     KNEE SURGERY         History reviewed  No pertinent family history  I have reviewed and agree with the history as documented  E-Cigarette/Vaping    E-Cigarette Use Never User      E-Cigarette/Vaping Substances    Nicotine No     THC No     CBD No     Flavoring No     Other No     Unknown No      Social History     Tobacco Use    Smoking status: Never Smoker    Smokeless tobacco: Never Used   Substance Use Topics    Alcohol use: Not Currently     Comment: social    Drug use: No       Review of Systems   Constitutional: Positive for chills, fatigue and fever  Negative for diaphoresis  HENT: Negative for ear pain and trouble swallowing  Eyes: Negative for discharge and redness  Respiratory: Positive for cough and shortness of breath  Negative for wheezing  Cardiovascular: Positive for chest pain  Negative for leg swelling and syncope  Gastrointestinal: Negative for abdominal pain, nausea and vomiting  Musculoskeletal: Negative for neck pain and neck stiffness  Skin: Negative for color change and rash  Neurological: Positive for weakness  Negative for syncope and headaches  Psychiatric/Behavioral: Negative for confusion  The patient is not nervous/anxious  All other systems reviewed and are negative  Physical Exam  Physical Exam  Vitals signs and nursing note reviewed  Constitutional:       General: He is not in acute distress  Appearance: He is well-developed  He is not diaphoretic  Comments: Patient with frequent dry cough on exam   HENT:      Head: Normocephalic and atraumatic  Right Ear: External ear normal       Left Ear: External ear normal    Eyes:      General: No scleral icterus  Right eye: No discharge  Left eye: No discharge        Conjunctiva/sclera: Conjunctivae normal    Neck:      Musculoskeletal: Normal range of motion and neck supple  Cardiovascular:      Rate and Rhythm: Normal rate and regular rhythm  Heart sounds: Normal heart sounds  No murmur  Pulmonary:      Effort: Pulmonary effort is normal  No respiratory distress  Breath sounds: No stridor  Examination of the right-lower field reveals rales  Examination of the left-lower field reveals rales  Rales present  No wheezing  Comments: Bibasilar rales  Frequent dry cough with noted desaturations during coughing episodes  Mild conversational dyspnea  Abdominal:      General: Bowel sounds are normal  There is no distension  Palpations: Abdomen is soft  Tenderness: There is no abdominal tenderness  There is no guarding  Musculoskeletal: Normal range of motion  General: No deformity  Right lower leg: No edema  Left lower leg: No edema  Skin:     General: Skin is warm and dry  Neurological:      Mental Status: He is alert  He is not disoriented  GCS: GCS eye subscore is 4  GCS verbal subscore is 5  GCS motor subscore is 6     Psychiatric:         Behavior: Behavior normal          Vital Signs  ED Triage Vitals [01/05/21 2143]   Temperature Pulse Respirations Blood Pressure SpO2   (!) 102 4 °F (39 1 °C) 103 (!) 32 115/68 94 %      Temp Source Heart Rate Source Patient Position - Orthostatic VS BP Location FiO2 (%)   Tympanic Monitor Sitting Right arm --      Pain Score       4           Vitals:    01/05/21 2143 01/05/21 2200   BP: 115/68 121/70   Pulse: 103 101   Patient Position - Orthostatic VS: Sitting Lying         Visual Acuity      ED Medications  Medications   sodium chloride 0 9 % bolus 1,000 mL (1,000 mL Intravenous New Bag 1/5/21 2216)   ceftriaxone (ROCEPHIN) 1 g/50 mL in dextrose IVPB (has no administration in time range)   doxycycline hyclate (VIBRAMYCIN) capsule 100 mg (has no administration in time range)   acetaminophen (TYLENOL) tablet 975 mg (975 mg Oral Given 1/5/21 2212)       Diagnostic Studies  Results Reviewed     Procedure Component Value Units Date/Time    C-reactive protein [[id number]]  (Abnormal) Collected: 01/05/21 2158    Lab Status: Final result Specimen: Blood from Arm, Right Updated: 01/05/21 2305     CRP 88 4 mg/L     NT-BNP PRO [[id number]]  (Normal) Collected: 01/05/21 2158    Lab Status: Final result Specimen: Blood from Arm, Right Updated: 01/05/21 2305     NT-proBNP 69 pg/mL     CK (with reflex to MB) [[id number]]  (Normal) Collected: 01/05/21 2158    Lab Status: Final result Specimen: Blood from Arm, Right Updated: 01/05/21 2305     Total CK 91 U/L     Magnesium [[id number]]  (Normal) Collected: 01/05/21 2158    Lab Status: Final result Specimen: Blood from Arm, Right Updated: 01/05/21 2305     Magnesium 2 2 mg/dL     Lactic acid, plasma [[id number]]  (Normal) Collected: 01/05/21 2158    Lab Status: Final result Specimen: Blood from Arm, Right Updated: 01/05/21 2236     LACTIC ACID 1 1 mmol/L     Narrative:      Result may be elevated if tourniquet was used during collection      Troponin I [[id number]]  (Normal) Collected: 01/05/21 2158    Lab Status: Final result Specimen: Blood from Arm, Right Updated: 01/05/21 2236     Troponin I <0 02 ng/mL     Comprehensive metabolic panel [[id number]]  (Abnormal) Collected: 01/05/21 2158    Lab Status: Final result Specimen: Blood from Arm, Right Updated: 01/05/21 2235     Sodium 132 mmol/L      Potassium 3 5 mmol/L      Chloride 94 mmol/L      CO2 28 mmol/L      ANION GAP 10 mmol/L      BUN 13 mg/dL      Creatinine 0 91 mg/dL      Glucose 201 mg/dL      Calcium 8 3 mg/dL      Corrected Calcium 9 1 mg/dL      AST 28 U/L      ALT 30 U/L      Alkaline Phosphatase 62 U/L      Total Protein 7 2 g/dL      Albumin 3 0 g/dL      Total Bilirubin 0 70 mg/dL      eGFR 95 ml/min/1 73sq m     Narrative:      [place] guidelines for Chronic Kidney Disease (CKD):     Stage 1 with normal or high GFR (GFR > 90 mL/min/1 73 square meters)    Stage 2 Mild CKD (GFR = 60-89 mL/min/1 73 square meters)    Stage 3A Moderate CKD (GFR = 45-59 mL/min/1 73 square meters)    Stage 3B Moderate CKD (GFR = 30-44 mL/min/1 73 square meters)    Stage 4 Severe CKD (GFR = 15-29 mL/min/1 73 square meters)    Stage 5 End Stage CKD (GFR <15 mL/min/1 73 square meters)  Note: GFR calculation is accurate only with a steady state creatinine    D-Dimer [[id number]]  (Normal) Collected: 01/05/21 2158    Lab Status: Final result Specimen: Blood from Arm, Right Updated: 01/05/21 2231     D-Dimer, Quant 0 47 ug/ml FEU     Protime-INR [[id number]]  (Normal) Collected: 01/05/21 2158    Lab Status: Final result Specimen: Blood from Arm, Right Updated: 01/05/21 2229     Protime 14 5 seconds      INR 1 11    APTT [[id number]]  (Normal) Collected: 01/05/21 2158    Lab Status: Final result Specimen: Blood from Arm, Right Updated: 01/05/21 2229     PTT 37 seconds     CBC and differential [[id number]]  (Abnormal) Collected: 01/05/21 2158    Lab Status: Final result Specimen: Blood from Arm, Right Updated: 01/05/21 2215     WBC 6 35 Thousand/uL      RBC 4 82 Million/uL      Hemoglobin 14 7 g/dL      Hematocrit 41 4 %      MCV 86 fL      MCH 30 5 pg      MCHC 35 5 g/dL      RDW 13 1 %      MPV 10 1 fL      Platelets 608 Thousands/uL      nRBC 0 /100 WBCs      Neutrophils Relative 81 %      Immat GRANS % 0 %      Lymphocytes Relative 13 %      Monocytes Relative 6 %      Eosinophils Relative 0 %      Basophils Relative 0 %      Neutrophils Absolute 5 17 Thousands/µL      Immature Grans Absolute 0 02 Thousand/uL      Lymphocytes Absolute 0 80 Thousands/µL      Monocytes Absolute 0 35 Thousand/µL      Eosinophils Absolute 0 00 Thousand/µL      Basophils Absolute 0 01 Thousands/µL     Ferritin [[id number]] Collected: 01/05/21 2158    Lab Status:  In process Specimen: Blood from Arm, Right Updated: 01/05/21 2211    Procalcitonin with AM Reflex [[id number]] Collected: 01/05/21 2158    Lab Status: In process Specimen: Blood from Arm, Right Updated: 01/05/21 2211    Blood culture #1 [[id number]] Collected: 01/05/21 2205    Lab Status: In process Specimen: Blood from Arm, Left Updated: 01/05/21 2211    Blood culture #2 [[id number]] Collected: 01/05/21 2202    Lab Status: In process Specimen: Blood from Hand, Left Updated: 01/05/21 2211                 XR chest 1 view portable   Final Result by Raul Kruse MD (01/06 0755)      Moderate bibasilar groundglass opacity due to Covid 19 pneumonia  Workstation performed: [id number]                    Procedures  Procedures         ED Course                   MDM  Number of Diagnoses or Management Options  Acute respiratory failure with hypoxia [facility]): new and requires workup  COVID-19: new and requires workup  Diagnosis management comments: 51yo male presenting for shortness of breath and chest pain  He is known COVID positive  Patient reports hypoxia at home with oxygen in the low to mid 80s  Oxygen saturation 85% by EMS  On exam, patient has a frequent dry cough with noted desaturations during coughing spells  He is febrile to 102 4  Differential diagnosis includes but is not limited to: COVID, pneumonia, volume overload, ACS, PE    Initial ED plan: Check blood cultures, lactate, CRP, ferritin, D-dimer, troponin, EKG, and CXR per protocol  Tylenol for fever  Will give dose of IV Rocephin and doxy  Given intermittent hypoxia, will admit          Amount and/or Complexity of Data Reviewed  Clinical lab tests: ordered and reviewed  Tests in the radiology section of CPT®: ordered and reviewed  Tests in the medicine section of CPT®: ordered and reviewed  Review and summarize past medical records: yes  Discuss the patient with other providers: yes  Independent visualization of images, tracings, or specimens: yes    Risk of Complications, Morbidity, and/or Mortality  Presenting problems: moderate  Diagnostic procedures: moderate  Management options: moderate        Disposition  Final diagnoses:   COVID-19   Acute respiratory failure with hypoxia ([address] )     Time reflects when diagnosis was documented in both MDM as applicable and the Disposition within this note     Time User Action Codes Description Comment    1/5/2021 10:49  [facility] [U07 1] COVID-19     1/5/2021 10:49  [facility] [J96 01] Acute respiratory failure with hypoxia [facility])       ED Disposition     ED Disposition Condition Date/Time Comment    Admit Stable Tue Jan 5, 2021 11:01 PM Case was discussed with Kyle Charlton PA-C and the patient's admission status was agreed to be Admission Status: observation status to the service of Dr Sidra South   Follow-up Information    None         Patient's Medications   Discharge Prescriptions    No medications on file     No discharge procedures on file      PDMP Review     None          ED Provider  Electronically Signed by           Ava Ravi PA-C  01/06/21 8383

## 2021-03-10 DIAGNOSIS — Z23 ENCOUNTER FOR IMMUNIZATION: ICD-10-CM

## 2021-04-21 NOTE — UTILIZATION REVIEW
Inpatient Admission Authorization Request   NOTIFICATION OF INPATIENT ADMISSION/INPATIENT AUTHORIZATION REQUEST   SERVICING FACILITY:   [address] [place] [address]  Tax ID: [id number]  NPI: [id number]  Place of Service: Inpatient [address] [facility]  [address]  Place of Service Code: 24     ATTENDING PROVIDER:  Attending Name and NPI#: Bebeto Briggs Md [[id number]]  Address: [address] [place] [address]  Phone: [phone]     UTILIZATION REVIEW CONTACT:  Alex Murdock, Utilization   Network Utilization Review Department  Phone: [phone]  Fax [phone]  Email: [email or web address]     PHYSICIAN ADVISORY SERVICES:  FOR [id number]-QL-PUWV REVIEW - MEDICAL NECESSITY DENIAL  Phone: [phone]  Fax: [phone]  Email: [email or web address]     TYPE OF REQUEST:  Inpatient Status     ADMISSION INFORMATION:  ADMISSION DATE/TIME: 1/6/21 1203  PATIENT DIAGNOSIS CODE/DESCRIPTION:  SOB (shortness of breath) [R06 02]  Acute respiratory failure with hypoxia (HCC) [J96 01]  COVID toes [U07 1, R23 8]  COVID-19 [U07 1]  DISCHARGE DATE/TIME: 1/11/2021  1:59 PM  DISCHARGE DISPOSITION (IF DISCHARGED): Home/Self Care     IMPORTANT INFORMATION:  Please contact the Alex Murdock directly with any questions or concerns regarding this request  Department voicemails are confidential     Send requests for admission clinical reviews, concurrent reviews, approvals, and administrative denials due to lack of clinical to fax [phone]

## 2021-12-06 ENCOUNTER — TELEMEDICINE (OUTPATIENT)
Dept: PULMONOLOGY | Facility: CLINIC | Age: 56
End: 2021-12-06
Payer: MEDICARE

## 2021-12-06 VITALS — WEIGHT: 173 LBS | BODY MASS INDEX: 26.22 KG/M2 | HEIGHT: 68 IN | TEMPERATURE: 98.7 F

## 2021-12-06 DIAGNOSIS — U07.1 PNEUMONIA DUE TO COVID-19 VIRUS: ICD-10-CM

## 2021-12-06 DIAGNOSIS — J12.82 PNEUMONIA DUE TO COVID-19 VIRUS: ICD-10-CM

## 2021-12-06 PROCEDURE — 99212 OFFICE O/P EST SF 10 MIN: CPT | Performed by: PHYSICIAN ASSISTANT

## 2022-10-12 PROBLEM — J12.82 PNEUMONIA DUE TO COVID-19 VIRUS: Status: RESOLVED | Noted: 2021-01-06 | Resolved: 2022-10-12

## 2022-10-12 PROBLEM — U07.1 PNEUMONIA DUE TO COVID-19 VIRUS: Status: RESOLVED | Noted: 2021-01-06 | Resolved: 2022-10-12

## 2024-07-29 ENCOUNTER — RA CDI HCC (OUTPATIENT)
Dept: OTHER | Facility: HOSPITAL | Age: 59
End: 2024-07-29